# Patient Record
Sex: FEMALE | Race: WHITE | NOT HISPANIC OR LATINO | URBAN - METROPOLITAN AREA
[De-identification: names, ages, dates, MRNs, and addresses within clinical notes are randomized per-mention and may not be internally consistent; named-entity substitution may affect disease eponyms.]

---

## 2024-04-05 ENCOUNTER — APPOINTMENT (OUTPATIENT)
Dept: RADIOLOGY | Facility: MEDICAL CENTER | Age: 29
DRG: 093 | End: 2024-04-05
Attending: EMERGENCY MEDICINE
Payer: COMMERCIAL

## 2024-04-05 ENCOUNTER — HOSPITAL ENCOUNTER (INPATIENT)
Facility: MEDICAL CENTER | Age: 29
LOS: 2 days | DRG: 093 | End: 2024-04-08
Attending: EMERGENCY MEDICINE | Admitting: STUDENT IN AN ORGANIZED HEALTH CARE EDUCATION/TRAINING PROGRAM
Payer: COMMERCIAL

## 2024-04-05 DIAGNOSIS — R51.9 ACUTE NONINTRACTABLE HEADACHE, UNSPECIFIED HEADACHE TYPE: ICD-10-CM

## 2024-04-05 DIAGNOSIS — R11.2 NAUSEA AND VOMITING, UNSPECIFIED VOMITING TYPE: ICD-10-CM

## 2024-04-05 DIAGNOSIS — M54.2 NECK PAIN: ICD-10-CM

## 2024-04-05 LAB
ANION GAP SERPL CALC-SCNC: 13 MMOL/L (ref 7–16)
BUN SERPL-MCNC: 7 MG/DL (ref 8–22)
CALCIUM SERPL-MCNC: 9.4 MG/DL (ref 8.5–10.5)
CHLORIDE SERPL-SCNC: 107 MMOL/L (ref 96–112)
CO2 SERPL-SCNC: 20 MMOL/L (ref 20–33)
CREAT SERPL-MCNC: 0.72 MG/DL (ref 0.5–1.4)
GFR SERPLBLD CREATININE-BSD FMLA CKD-EPI: 116 ML/MIN/1.73 M 2
GLUCOSE SERPL-MCNC: 97 MG/DL (ref 65–99)
HCG SERPL QL: NEGATIVE
POTASSIUM SERPL-SCNC: 4 MMOL/L (ref 3.6–5.5)
SODIUM SERPL-SCNC: 140 MMOL/L (ref 135–145)

## 2024-04-05 PROCEDURE — 96372 THER/PROPH/DIAG INJ SC/IM: CPT

## 2024-04-05 PROCEDURE — A9270 NON-COVERED ITEM OR SERVICE: HCPCS | Performed by: STUDENT IN AN ORGANIZED HEALTH CARE EDUCATION/TRAINING PROGRAM

## 2024-04-05 PROCEDURE — 96375 TX/PRO/DX INJ NEW DRUG ADDON: CPT

## 2024-04-05 PROCEDURE — 99222 1ST HOSP IP/OBS MODERATE 55: CPT | Performed by: STUDENT IN AN ORGANIZED HEALTH CARE EDUCATION/TRAINING PROGRAM

## 2024-04-05 PROCEDURE — 700111 HCHG RX REV CODE 636 W/ 250 OVERRIDE (IP): Mod: JZ | Performed by: EMERGENCY MEDICINE

## 2024-04-05 PROCEDURE — 99285 EMERGENCY DEPT VISIT HI MDM: CPT

## 2024-04-05 PROCEDURE — 84100 ASSAY OF PHOSPHORUS: CPT

## 2024-04-05 PROCEDURE — 70450 CT HEAD/BRAIN W/O DYE: CPT

## 2024-04-05 PROCEDURE — 80048 BASIC METABOLIC PNL TOTAL CA: CPT

## 2024-04-05 PROCEDURE — 700111 HCHG RX REV CODE 636 W/ 250 OVERRIDE (IP): Mod: JZ | Performed by: STUDENT IN AN ORGANIZED HEALTH CARE EDUCATION/TRAINING PROGRAM

## 2024-04-05 PROCEDURE — 83735 ASSAY OF MAGNESIUM: CPT

## 2024-04-05 PROCEDURE — G0378 HOSPITAL OBSERVATION PER HR: HCPCS

## 2024-04-05 PROCEDURE — 36415 COLL VENOUS BLD VENIPUNCTURE: CPT

## 2024-04-05 PROCEDURE — 700105 HCHG RX REV CODE 258: Performed by: EMERGENCY MEDICINE

## 2024-04-05 PROCEDURE — 96374 THER/PROPH/DIAG INJ IV PUSH: CPT

## 2024-04-05 PROCEDURE — 700102 HCHG RX REV CODE 250 W/ 637 OVERRIDE(OP): Performed by: STUDENT IN AN ORGANIZED HEALTH CARE EDUCATION/TRAINING PROGRAM

## 2024-04-05 PROCEDURE — 85025 COMPLETE CBC W/AUTO DIFF WBC: CPT

## 2024-04-05 PROCEDURE — 84703 CHORIONIC GONADOTROPIN ASSAY: CPT

## 2024-04-05 PROCEDURE — 96376 TX/PRO/DX INJ SAME DRUG ADON: CPT

## 2024-04-05 RX ORDER — PROMETHAZINE HYDROCHLORIDE 25 MG/1
12.5-25 SUPPOSITORY RECTAL EVERY 4 HOURS PRN
Status: DISCONTINUED | OUTPATIENT
Start: 2024-04-05 | End: 2024-04-08 | Stop reason: HOSPADM

## 2024-04-05 RX ORDER — DIPHENHYDRAMINE HYDROCHLORIDE 50 MG/ML
25 INJECTION INTRAMUSCULAR; INTRAVENOUS ONCE
Status: COMPLETED | OUTPATIENT
Start: 2024-04-05 | End: 2024-04-05

## 2024-04-05 RX ORDER — ONDANSETRON 2 MG/ML
4 INJECTION INTRAMUSCULAR; INTRAVENOUS EVERY 4 HOURS PRN
Status: DISCONTINUED | OUTPATIENT
Start: 2024-04-05 | End: 2024-04-08 | Stop reason: HOSPADM

## 2024-04-05 RX ORDER — PROMETHAZINE HYDROCHLORIDE 25 MG/1
12.5-25 TABLET ORAL EVERY 4 HOURS PRN
Status: DISCONTINUED | OUTPATIENT
Start: 2024-04-05 | End: 2024-04-08 | Stop reason: HOSPADM

## 2024-04-05 RX ORDER — ONDANSETRON 4 MG/1
4 TABLET, ORALLY DISINTEGRATING ORAL EVERY 4 HOURS PRN
Status: DISCONTINUED | OUTPATIENT
Start: 2024-04-05 | End: 2024-04-08 | Stop reason: HOSPADM

## 2024-04-05 RX ORDER — IBUPROFEN 400 MG/1
600 TABLET ORAL EVERY 6 HOURS PRN
Status: DISCONTINUED | OUTPATIENT
Start: 2024-04-05 | End: 2024-04-08 | Stop reason: HOSPADM

## 2024-04-05 RX ORDER — KETOROLAC TROMETHAMINE 15 MG/ML
15 INJECTION, SOLUTION INTRAMUSCULAR; INTRAVENOUS ONCE
Status: COMPLETED | OUTPATIENT
Start: 2024-04-05 | End: 2024-04-05

## 2024-04-05 RX ORDER — SODIUM CHLORIDE 9 MG/ML
1000 INJECTION, SOLUTION INTRAVENOUS ONCE
Status: COMPLETED | OUTPATIENT
Start: 2024-04-05 | End: 2024-04-05

## 2024-04-05 RX ORDER — METOCLOPRAMIDE HYDROCHLORIDE 5 MG/ML
10 INJECTION INTRAMUSCULAR; INTRAVENOUS ONCE
Status: COMPLETED | OUTPATIENT
Start: 2024-04-05 | End: 2024-04-05

## 2024-04-05 RX ORDER — ENOXAPARIN SODIUM 100 MG/ML
40 INJECTION SUBCUTANEOUS DAILY
Status: DISCONTINUED | OUTPATIENT
Start: 2024-04-05 | End: 2024-04-06

## 2024-04-05 RX ORDER — IBUPROFEN 200 MG
600 TABLET ORAL EVERY 6 HOURS PRN
Status: ON HOLD | COMMUNITY
End: 2024-04-08

## 2024-04-05 RX ORDER — PROCHLORPERAZINE EDISYLATE 5 MG/ML
5-10 INJECTION INTRAMUSCULAR; INTRAVENOUS EVERY 4 HOURS PRN
Status: DISCONTINUED | OUTPATIENT
Start: 2024-04-05 | End: 2024-04-08 | Stop reason: HOSPADM

## 2024-04-05 RX ORDER — ACETAMINOPHEN 325 MG/1
650 TABLET ORAL EVERY 6 HOURS PRN
Status: DISCONTINUED | OUTPATIENT
Start: 2024-04-05 | End: 2024-04-08 | Stop reason: HOSPADM

## 2024-04-05 RX ORDER — DEXAMETHASONE SODIUM PHOSPHATE 4 MG/ML
10 INJECTION, SOLUTION INTRA-ARTICULAR; INTRALESIONAL; INTRAMUSCULAR; INTRAVENOUS; SOFT TISSUE ONCE
Status: COMPLETED | OUTPATIENT
Start: 2024-04-05 | End: 2024-04-05

## 2024-04-05 RX ORDER — CYCLOBENZAPRINE HCL 10 MG
10 TABLET ORAL EVERY 6 HOURS PRN
Qty: 10 TABLET | Refills: 0 | Status: SHIPPED | OUTPATIENT
Start: 2024-04-05

## 2024-04-05 RX ADMIN — DIPHENHYDRAMINE HYDROCHLORIDE 25 MG: 50 INJECTION, SOLUTION INTRAMUSCULAR; INTRAVENOUS at 17:41

## 2024-04-05 RX ADMIN — METOCLOPRAMIDE 10 MG: 5 INJECTION, SOLUTION INTRAMUSCULAR; INTRAVENOUS at 17:42

## 2024-04-05 RX ADMIN — SODIUM CHLORIDE 1000 ML: 9 INJECTION, SOLUTION INTRAVENOUS at 15:23

## 2024-04-05 RX ADMIN — KETOROLAC TROMETHAMINE 15 MG: 15 INJECTION, SOLUTION INTRAMUSCULAR; INTRAVENOUS at 15:30

## 2024-04-05 RX ADMIN — IBUPROFEN 600 MG: 400 TABLET, FILM COATED ORAL at 20:41

## 2024-04-05 RX ADMIN — DIPHENHYDRAMINE HYDROCHLORIDE 25 MG: 50 INJECTION, SOLUTION INTRAMUSCULAR; INTRAVENOUS at 15:25

## 2024-04-05 RX ADMIN — DEXAMETHASONE SODIUM PHOSPHATE 10 MG: 4 INJECTION INTRA-ARTICULAR; INTRALESIONAL; INTRAMUSCULAR; INTRAVENOUS; SOFT TISSUE at 17:43

## 2024-04-05 RX ADMIN — ENOXAPARIN SODIUM 40 MG: 100 INJECTION SUBCUTANEOUS at 20:41

## 2024-04-05 RX ADMIN — METOCLOPRAMIDE 10 MG: 5 INJECTION, SOLUTION INTRAMUSCULAR; INTRAVENOUS at 15:24

## 2024-04-05 ASSESSMENT — PATIENT HEALTH QUESTIONNAIRE - PHQ9
SUM OF ALL RESPONSES TO PHQ9 QUESTIONS 1 AND 2: 0
2. FEELING DOWN, DEPRESSED, IRRITABLE, OR HOPELESS: NOT AT ALL
1. LITTLE INTEREST OR PLEASURE IN DOING THINGS: NOT AT ALL

## 2024-04-05 ASSESSMENT — PAIN DESCRIPTION - PAIN TYPE
TYPE: ACUTE PAIN
TYPE: ACUTE PAIN

## 2024-04-05 ASSESSMENT — ENCOUNTER SYMPTOMS
TINGLING: 0
DOUBLE VISION: 0
HALLUCINATIONS: 0
DIARRHEA: 0
FEVER: 0
INSOMNIA: 0
BACK PAIN: 0
SHORTNESS OF BREATH: 0
SENSORY CHANGE: 0
NERVOUS/ANXIOUS: 0
FOCAL WEAKNESS: 0
ABDOMINAL PAIN: 0
TREMORS: 0
SINUS PAIN: 0
PHOTOPHOBIA: 0
HEADACHES: 1
WEAKNESS: 0
SPUTUM PRODUCTION: 0
PALPITATIONS: 0
CHILLS: 0
DIZZINESS: 0
SPEECH CHANGE: 0
EYE DISCHARGE: 0
ORTHOPNEA: 0
HEMOPTYSIS: 0
VOMITING: 1
NECK PAIN: 0
NAUSEA: 1
EYE PAIN: 0

## 2024-04-05 ASSESSMENT — LIFESTYLE VARIABLES
AVERAGE NUMBER OF DAYS PER WEEK YOU HAVE A DRINK CONTAINING ALCOHOL: 1
EVER HAD A DRINK FIRST THING IN THE MORNING TO STEADY YOUR NERVES TO GET RID OF A HANGOVER: NO
DOES PATIENT WANT TO STOP DRINKING: NO
HOW MANY TIMES IN THE PAST YEAR HAVE YOU HAD 5 OR MORE DRINKS IN A DAY: 5
TOTAL SCORE: 0
ALCOHOL_USE: YES
EVER FELT BAD OR GUILTY ABOUT YOUR DRINKING: NO
ON A TYPICAL DAY WHEN YOU DRINK ALCOHOL HOW MANY DRINKS DO YOU HAVE: 5
HAVE PEOPLE ANNOYED YOU BY CRITICIZING YOUR DRINKING: NO
SUBSTANCE_ABUSE: 0
HAVE YOU EVER FELT YOU SHOULD CUT DOWN ON YOUR DRINKING: NO
CONSUMPTION TOTAL: POSITIVE

## 2024-04-05 NOTE — ED PROVIDER NOTES
ED Provider Note    CHIEF COMPLAINT  Chief Complaint   Patient presents with    Headache     Upper back pain that started on Tuesday when pt woke up. Pt started to radiate up her neck and into her head on Wednesday. Pt rates headache at 9/10 and is worst in the front of her head. +nausea due to pain. Pt states that it hurts to turn her head. Pt has some relief when laying flat.      EXTERNAL RECORDS REVIEWED  No recent or pertinent records.  She was admitted to [facility] for a abdominopelvic abscess.     HPI/ROS  LIMITATION TO HISTORY   Select: : None  OUTSIDE HISTORIAN(S):  None    Rachel Villalobos is a 29 y.o. female who presents to the Emergency Department with headache onset 3 days ago. She describes the pain as a sharp pressure. She reported having some neck pain 4 days ago that worsened throughout the day and ultimately moved up to her neck. She additionally reports one episode of vomiting yesterday. The headache is worsened when she is sitting up and improves when she is laying flat. She states that the neck pain worsens with movement. She notes that she has been unable to get out of bed secondary to the pain. She was seen at urgent care today prior to arrival and was advised to report to the ED. She denies any recent illness, photophobia or phonophobia. She states that she drank 120 ounces of water yesterday and usually stays pretty hydrated. She does not drink coffee. She denies any recent trauma or mechanical injury. She has taken Excedrin and ibuprofen without any improvement of symptoms. She has not taken anything today.     PAST MEDICAL HISTORY  History reviewed. No pertinent past medical history.     SURGICAL HISTORY  History reviewed. No pertinent surgical history.     FAMILY HISTORY  History reviewed. No pertinent family history.    SOCIAL HISTORY   reports that she has never smoked. She has never used smokeless tobacco. She reports current alcohol use. She reports  "that she does not currently use drugs.    CURRENT MEDICATIONS  None noted    ALLERGIES  Patient has no known allergies.    PHYSICAL EXAM  BP (!) 136/90   Pulse (!) 55   Temp 36.2 °C (97.2 °F) (Temporal)   Resp 16   Ht 1.702 m (5' 7\")   Wt 79.8 kg (175 lb 14.8 oz)   SpO2 99%      Constitutional: Nontoxic appearing. Alert in no apparent distress.  HENT: Normocephalic, Atraumatic. Bilateral external ears normal. Nose normal.  Moist mucous membranes.  Oropharynx clear.  Eyes: Pupils are equal and reactive. Conjunctiva normal.   Neck: Supple, full range of motion.Tenderness diffusely along the neck and shoulders, worse along the right paraspinal muscles.   Heart: Regular rate and rhythm.  No murmurs.    Lungs: No respiratory distress, normal work of breathing. Lungs clear to auscultation bilaterally.  Abdomen Soft, no distention.  No tenderness to palpation.  Musculoskeletal: Atraumatic. No obvious deformities noted.  No lower extremity edema.  Skin: Warm, Dry.  No erythema, No rash.   Neurologic: Alert and oriented x3.  Cranial nerves II-XII intact.  Strength 5/5 and sensation intact throughout all 4 extremities.  No pronator drift.  No dysmetria.  Normal speech. Normal gait.  Psychiatric: Affect normal, Mood normal, Appears appropriate and not intoxicated.    DIAGNOSTIC STUDIES / PROCEDURES      LABS  Labs Reviewed   BASIC METABOLIC PANEL - Abnormal; Notable for the following components:       Result Value    Bun 7 (*)     All other components within normal limits   HCG QUAL SERUM   ESTIMATED GFR     RADIOLOGY  I have independently interpreted the diagnostic imaging associated with this visit and am waiting the final reading from the radiologist.   My preliminary interpretation is as follows: no intracranial mass    Radiologist interpretation:  CT-HEAD W/O   Final Result         1. No acute intracranial abnormality. No evidence of acute intracranial hemorrhage or mass lesion.                       COURSE & " MEDICAL DECISION MAKING    2:50 PM - Patient seen and examined at bedside. Discussed plan of care, including ordering labs to further evaluate. Patient agrees to the plan of care. The patient will be resuscitated with 1L NS IV and medicated with Toradol 15 mg/mL injection, Reglan injection 10 mg, and Benadryl injection 25 mg. Ordered for BMP and hCG to evaluate her symptoms.     4:26 PM - Upon reassessment, patient is resting comfortably with stable vital signs. Her pain has improved following treatment. Discussed likelihood that patient's symptoms were due to muscle tightness along her neck causing a tension headache. Discussed having patient ambulate to see if her symptoms worsen before discharging home. She is agreeable to this plan.     5:15 PM - Reevaluated patient at bedside. Her headache is significantly worsened when she sits up. Given these findings, and concern for increased intracranial pressure, CT-head w/o to rule out any emergent processes.     ASSESSMENT, COURSE AND PLAN  Care Narrative: Young relatively healthy patient who presents with gradually worsening positional headache which has been going on for the last 4 days.  She is overall well-appearing with normal vital signs on arrival.  She has no focal neurologic deficits on exam.  History and exam are not concerning for subarachnoid hemorrhage or meningitis.  Initially this was thought to be due to tension headache therefore she was treated symptomatically.  The patient's symptoms are essentially gone when lying down however quickly develop upon sitting up.  Due to persistent positional symptoms, imaging was performed without evidence of intracranial mass or hemorrhage.  The patient continues to be significantly symptomatic therefore I think will need admission for further workup.  I am concerned about a possible spontaneous CSF leak.    7:30PM -I discussed the case with Dr. Brooks, neurologist on-call.  He is recommending MRI of the brain with  and without contrast to assess for secondary signs of low CSF pressure.  If this is abnormal, she may need further imaging of her spine.  Recommends IV hydration and caffeine.  Patient will need general neurology consult in the morning    7:56 PM - Upon reassessment, patient is resting comfortably with normal vital signs.  No new complaints at this time.  Discussed results with patient and/or family as well as plan of care for admission. Patient verbalizes understanding and agreement to this plan of care    DISPOSITION AND DISCUSSIONS  I have discussed management of the patient with the following physicians and LEILANI's:    Dr. Brooks  7:57 PM -I discussed the case with Dr. Sorensen, hospitalist on-call, who accepts admission of the patient.      Barriers to care at this time, including but not limited to: Patient does not have established PCP.     DISPOSITION:  Patient will be hospitalized by Dr. Sorensen in guarded condition.      FINAL DIAGNOSIS  1. Acute nonintractable headache, unspecified headache type    2. Neck pain        The note accurately reflects work and decisions made by me.  Elaine Mosqueda M.D.  4/5/2024  7:59 PM     Katie ACEVEDO (Yasir), am scribing for, and in the presence of, Elaine Mosqueda M.D..    Electronically signed by: Katie Mendoza), 4/5/2024    Elaine ACEVEDO M.D. personally performed the services described in this documentation, as scribed by Katie Broussard in my presence, and it is both accurate and complete.

## 2024-04-05 NOTE — ED TRIAGE NOTES
"Chief Complaint   Patient presents with    Back Pain     Upper back pain that started on Tuesday when pt woke up. Pt started to radiate up her neck and into her head on Wednesday. Pt rates headache at 9/10 and is worst in the front of her head. +nausea due to pain. Pt states that it hurts to turn her head.          Pt ambulated to triage for above complaint.  Pt is AO x 4, follows commands, and responds appropriately to questions. Patient's breathing is unlabored and pain is currently 9/10 on the 0-10 pain scale.  Pt placed in lobby. Patient educated on triage process and encouraged to alert staff for any changes.      BP (!) 136/90   Pulse (!) 55   Temp 36.2 °C (97.2 °F) (Temporal)   Resp 16   Ht 1.702 m (5' 7\")   Wt 79.8 kg (175 lb 14.8 oz)   SpO2 99%     "

## 2024-04-05 NOTE — DISCHARGE INSTRUCTIONS
You were seen in the Emergency Department for headache.     Labs were completed without significant acute abnormalities.    Please use 1,000mg of tylenol or 600mg of ibuprofen every 6 hours as needed for pain.  You may attempt caffeine as well which can alleviate headaches.  Use muscle relaxer as needed for severe pain.  Drink plenty of fluids.    Please follow up with your primary care physician.    Return to the Emergency Department with uncontrolled pain, new neurologic symptoms such as blurred vision, slurred speech, numbness or weakness in extremities, or other concerns.

## 2024-04-06 ENCOUNTER — APPOINTMENT (OUTPATIENT)
Dept: RADIOLOGY | Facility: MEDICAL CENTER | Age: 29
DRG: 093 | End: 2024-04-06
Payer: COMMERCIAL

## 2024-04-06 PROBLEM — D18.1 HYGROMA: Status: ACTIVE | Noted: 2024-04-06

## 2024-04-06 LAB
ALBUMIN SERPL BCP-MCNC: 4.2 G/DL (ref 3.2–4.9)
ALBUMIN/GLOB SERPL: 1.3 G/DL
ALP SERPL-CCNC: 79 U/L (ref 30–99)
ALT SERPL-CCNC: 20 U/L (ref 2–50)
ANION GAP SERPL CALC-SCNC: 16 MMOL/L (ref 7–16)
AST SERPL-CCNC: 11 U/L (ref 12–45)
BASOPHILS # BLD AUTO: 0.4 % (ref 0–1.8)
BASOPHILS # BLD: 0.04 K/UL (ref 0–0.12)
BILIRUB SERPL-MCNC: 0.3 MG/DL (ref 0.1–1.5)
BUN SERPL-MCNC: 8 MG/DL (ref 8–22)
CALCIUM ALBUM COR SERPL-MCNC: 9.2 MG/DL (ref 8.5–10.5)
CALCIUM SERPL-MCNC: 9.4 MG/DL (ref 8.5–10.5)
CHLORIDE SERPL-SCNC: 108 MMOL/L (ref 96–112)
CO2 SERPL-SCNC: 15 MMOL/L (ref 20–33)
CREAT SERPL-MCNC: 0.6 MG/DL (ref 0.5–1.4)
EOSINOPHIL # BLD AUTO: 0.03 K/UL (ref 0–0.51)
EOSINOPHIL NFR BLD: 0.3 % (ref 0–6.9)
ERYTHROCYTE [DISTWIDTH] IN BLOOD BY AUTOMATED COUNT: 43.3 FL (ref 35.9–50)
ERYTHROCYTE [DISTWIDTH] IN BLOOD BY AUTOMATED COUNT: 44.4 FL (ref 35.9–50)
GFR SERPLBLD CREATININE-BSD FMLA CKD-EPI: 124 ML/MIN/1.73 M 2
GLOBULIN SER CALC-MCNC: 3.2 G/DL (ref 1.9–3.5)
GLUCOSE SERPL-MCNC: 149 MG/DL (ref 65–99)
HCT VFR BLD AUTO: 44.1 % (ref 37–47)
HCT VFR BLD AUTO: 46.9 % (ref 37–47)
HGB BLD-MCNC: 15.1 G/DL (ref 12–16)
HGB BLD-MCNC: 15.5 G/DL (ref 12–16)
IMM GRANULOCYTES # BLD AUTO: 0.03 K/UL (ref 0–0.11)
IMM GRANULOCYTES NFR BLD AUTO: 0.3 % (ref 0–0.9)
LYMPHOCYTES # BLD AUTO: 2.21 K/UL (ref 1–4.8)
LYMPHOCYTES NFR BLD: 23.2 % (ref 22–41)
MAGNESIUM SERPL-MCNC: 1.9 MG/DL (ref 1.5–2.5)
MCH RBC QN AUTO: 31.6 PG (ref 27–33)
MCH RBC QN AUTO: 31.9 PG (ref 27–33)
MCHC RBC AUTO-ENTMCNC: 33 G/DL (ref 32.2–35.5)
MCHC RBC AUTO-ENTMCNC: 34.2 G/DL (ref 32.2–35.5)
MCV RBC AUTO: 93.2 FL (ref 81.4–97.8)
MCV RBC AUTO: 95.7 FL (ref 81.4–97.8)
MONOCYTES # BLD AUTO: 0.62 K/UL (ref 0–0.85)
MONOCYTES NFR BLD AUTO: 6.5 % (ref 0–13.4)
NEUTROPHILS # BLD AUTO: 6.59 K/UL (ref 1.82–7.42)
NEUTROPHILS NFR BLD: 69.3 % (ref 44–72)
NRBC # BLD AUTO: 0 K/UL
NRBC BLD-RTO: 0 /100 WBC (ref 0–0.2)
PHOSPHATE SERPL-MCNC: 2.6 MG/DL (ref 2.5–4.5)
PLATELET # BLD AUTO: 331 K/UL (ref 164–446)
PLATELET # BLD AUTO: 352 K/UL (ref 164–446)
PMV BLD AUTO: 10.9 FL (ref 9–12.9)
PMV BLD AUTO: 9.7 FL (ref 9–12.9)
POTASSIUM SERPL-SCNC: 4.3 MMOL/L (ref 3.6–5.5)
PROT SERPL-MCNC: 7.4 G/DL (ref 6–8.2)
RBC # BLD AUTO: 4.73 M/UL (ref 4.2–5.4)
RBC # BLD AUTO: 4.9 M/UL (ref 4.2–5.4)
SODIUM SERPL-SCNC: 139 MMOL/L (ref 135–145)
WBC # BLD AUTO: 11.1 K/UL (ref 4.8–10.8)
WBC # BLD AUTO: 9.5 K/UL (ref 4.8–10.8)

## 2024-04-06 PROCEDURE — 80053 COMPREHEN METABOLIC PANEL: CPT

## 2024-04-06 PROCEDURE — A9270 NON-COVERED ITEM OR SERVICE: HCPCS

## 2024-04-06 PROCEDURE — 85027 COMPLETE CBC AUTOMATED: CPT

## 2024-04-06 PROCEDURE — 700111 HCHG RX REV CODE 636 W/ 250 OVERRIDE (IP): Mod: JZ

## 2024-04-06 PROCEDURE — A9579 GAD-BASE MR CONTRAST NOS,1ML: HCPCS

## 2024-04-06 PROCEDURE — 70553 MRI BRAIN STEM W/O & W/DYE: CPT

## 2024-04-06 PROCEDURE — 770001 HCHG ROOM/CARE - MED/SURG/GYN PRIV*

## 2024-04-06 PROCEDURE — 700117 HCHG RX CONTRAST REV CODE 255

## 2024-04-06 PROCEDURE — 700102 HCHG RX REV CODE 250 W/ 637 OVERRIDE(OP)

## 2024-04-06 PROCEDURE — 700101 HCHG RX REV CODE 250

## 2024-04-06 PROCEDURE — 96376 TX/PRO/DX INJ SAME DRUG ADON: CPT

## 2024-04-06 PROCEDURE — 72156 MRI NECK SPINE W/O & W/DYE: CPT

## 2024-04-06 PROCEDURE — 99233 SBSQ HOSP IP/OBS HIGH 50: CPT | Mod: FS

## 2024-04-06 PROCEDURE — 700105 HCHG RX REV CODE 258: Performed by: STUDENT IN AN ORGANIZED HEALTH CARE EDUCATION/TRAINING PROGRAM

## 2024-04-06 PROCEDURE — 96375 TX/PRO/DX INJ NEW DRUG ADDON: CPT

## 2024-04-06 PROCEDURE — 99222 1ST HOSP IP/OBS MODERATE 55: CPT | Performed by: PSYCHIATRY & NEUROLOGY

## 2024-04-06 RX ORDER — DIAZEPAM 5 MG/ML
2.5 INJECTION, SOLUTION INTRAMUSCULAR; INTRAVENOUS ONCE
Status: COMPLETED | OUTPATIENT
Start: 2024-04-06 | End: 2024-04-06

## 2024-04-06 RX ORDER — CYCLOBENZAPRINE HCL 5 MG
10 TABLET ORAL 3 TIMES DAILY PRN
Status: DISCONTINUED | OUTPATIENT
Start: 2024-04-06 | End: 2024-04-06

## 2024-04-06 RX ORDER — LIDOCAINE 4 G/G
1 PATCH TOPICAL EVERY 24 HOURS
Status: DISCONTINUED | OUTPATIENT
Start: 2024-04-06 | End: 2024-04-06

## 2024-04-06 RX ORDER — HYDROXYZINE HYDROCHLORIDE 25 MG/1
25 TABLET, FILM COATED ORAL
Status: COMPLETED | OUTPATIENT
Start: 2024-04-06 | End: 2024-04-06

## 2024-04-06 RX ORDER — SUMATRIPTAN 25 MG/1
25 TABLET, FILM COATED ORAL
Status: DISCONTINUED | OUTPATIENT
Start: 2024-04-06 | End: 2024-04-08 | Stop reason: HOSPADM

## 2024-04-06 RX ORDER — DIAZEPAM 5 MG/ML
2.5 INJECTION, SOLUTION INTRAMUSCULAR; INTRAVENOUS EVERY 6 HOURS PRN
Status: DISCONTINUED | OUTPATIENT
Start: 2024-04-06 | End: 2024-04-06

## 2024-04-06 RX ORDER — SODIUM CHLORIDE 9 MG/ML
INJECTION, SOLUTION INTRAVENOUS CONTINUOUS
Status: DISCONTINUED | OUTPATIENT
Start: 2024-04-06 | End: 2024-04-07

## 2024-04-06 RX ORDER — BUTALBITAL, ACETAMINOPHEN AND CAFFEINE 50; 325; 40 MG/1; MG/1; MG/1
1 TABLET ORAL EVERY 6 HOURS PRN
Status: DISCONTINUED | OUTPATIENT
Start: 2024-04-06 | End: 2024-04-08 | Stop reason: HOSPADM

## 2024-04-06 RX ADMIN — HYDROXYZINE HYDROCHLORIDE 25 MG: 25 TABLET, FILM COATED ORAL at 20:30

## 2024-04-06 RX ADMIN — CYCLOBENZAPRINE HYDROCHLORIDE 10 MG: 5 TABLET, FILM COATED ORAL at 13:35

## 2024-04-06 RX ADMIN — GADOTERIDOL 15 ML: 279.3 INJECTION, SOLUTION INTRAVENOUS at 12:57

## 2024-04-06 RX ADMIN — SODIUM CHLORIDE: 9 INJECTION, SOLUTION INTRAVENOUS at 18:06

## 2024-04-06 RX ADMIN — LIDOCAINE 1 PATCH: 4 PATCH TOPICAL at 13:35

## 2024-04-06 RX ADMIN — BUTALBITAL, ACETAMINOPHEN AND CAFFEINE 1 TABLET: 325; 50; 40 TABLET ORAL at 05:11

## 2024-04-06 RX ADMIN — DIAZEPAM 2.5 MG: 5 INJECTION, SOLUTION INTRAMUSCULAR; INTRAVENOUS at 11:59

## 2024-04-06 ASSESSMENT — ENCOUNTER SYMPTOMS
SENSORY CHANGE: 0
TINGLING: 0
CHILLS: 0
SORE THROAT: 0
COUGH: 0
HEADACHES: 1
DOUBLE VISION: 0
VOMITING: 0
SHORTNESS OF BREATH: 0
BLURRED VISION: 0
WEAKNESS: 0
FALLS: 0
NECK PAIN: 1
PALPITATIONS: 0
ABDOMINAL PAIN: 0
NERVOUS/ANXIOUS: 0
PHOTOPHOBIA: 0
FEVER: 0
NAUSEA: 1
DIZZINESS: 0

## 2024-04-06 ASSESSMENT — PAIN DESCRIPTION - PAIN TYPE
TYPE: ACUTE PAIN
TYPE: ACUTE PAIN

## 2024-04-06 NOTE — ASSESSMENT & PLAN NOTE
Neuro IR on the case  MRI brain, cervical, thoracic, lumbar spine with and without completed showing hygroma from C3-4 to L3-4 and upper thoracic levels showing mild central stenosis with effacement of subarachnoid space in the thecal sac

## 2024-04-06 NOTE — PROGRESS NOTES
Patient returned to unit from MRI. Patient in bed and medications given per MAR. Bed in lowest position and locked with call light within reach.

## 2024-04-06 NOTE — HOSPITAL COURSE
Rachel Villalobos is a 29 y.o. female with no past medical history who presented 4/5/2024 with headaches.     Patient denies any history of headaches in the past or history of migraines.  States that over the last 4 days she is experiencing positional headaches. She states that when she lays down flat, she feels fine. However when she sits up, after 30 seconds she begins to experience headaches located in the frontal and occipital regions.  It is associated with some nausea and vomiting as well.      In the emergency department, vital signs stable.  CT head was unremarkable.  Case was briefly discussed between the ER physician and neurology on-call.  Due to concern for CSF leak, recommending MRI brain with and without contrast at this time.     MRI brain and cervical spine completed which is showing findings consistent with subdural or epidural hygroma.  This may be related to spontaneous or posttraumatic CSF leak.  I have discussed the case with neuro IR and neurosurgery.  At this time neuro IR is recommending MRI of the thoracic and lumbar spine. Potential intervention by neuro IR on Monday.    Patient seen and examined this a.m.  She states that there is not much change from yesterday where getting up still gives her headache but today it is taking longer for the headache onset.  She is denying any numbness, tingling, dizziness, lightheadedness, or any other sensory deficits.  The only positive review of system is headache.

## 2024-04-06 NOTE — ASSESSMENT & PLAN NOTE
CT head unremarkable  MRI brain, cervical, thoracic, lumbar spine with and without completed -no finding that required surgical intervention    Patch ordered  Tylenol and ibuprofen ordered as needed for headaches

## 2024-04-06 NOTE — ED NOTES
Bedside report received from off going RN Robin, assumed care of patient.  POC discussed with patient. Call light within reach, all needs addressed at this time.       Fall risk interventions in place: Move the patient closer to the nurse's station, Patient's personal possessions are with in their safe reach, Place socks on patient, Place fall risk sign on patient's door, Give patient urinal if applicable, Keep floor surfaces clean and dry, and Accompanied to restroom (all applicable per [facility] Fall risk assessment)   Continuous monitoring: Cardiac Leads, Pulse Ox, or Blood Pressure  IVF/IV medications: Not Applicable   Oxygen: Room Air  Bedside sitter: Not Applicable   Isolation: Not Applicable

## 2024-04-06 NOTE — CARE PLAN
The patient is Stable - Low risk of patient condition declining or worsening    Shift Goals  Clinical Goals: MRI  Patient Goals: relieve headache    Progress made toward(s) clinical / shift goals:    Problem: Pain - Standard  Goal: Alleviation of pain or a reduction in pain to the patient’s comfort goal  Description: Target End Date:  Prior to discharge or change in level of care    Document on Vitals flowsheet    1.  Document pain using the appropriate pain scale per order or unit policy  2.  Educate and implement non-pharmacologic comfort measures (i.e. relaxation, distraction, massage, cold/heat therapy, etc.)  3.  Pain management medications as ordered  4.  Reassess pain after pain med administration per policy  5.  If opiods administered assess patient's response to pain medication is appropriate per POSS sedation scale  6.  Follow pain management plan developed in collaboration with patient and interdisciplinary team (including palliative care or pain specialists if applicable)  Outcome: Progressing     Problem: Knowledge Deficit - Standard  Goal: Patient and family/care givers will demonstrate understanding of plan of care, disease process/condition, diagnostic tests and medications  Description: Target End Date:  1-3 days or as soon as patient condition allows    Document in Patient Education    1.  Patient and family/caregiver oriented to unit, equipment, visitation policy and means for communicating concern  2.  Complete/review Learning Assessment  3.  Assess knowledge level of disease process/condition, treatment plan, diagnostic tests and medications  4.  Explain disease process/condition, treatment plan, diagnostic tests and medications  Outcome: Progressing

## 2024-04-06 NOTE — ED NOTES
BS report from Salvatore RN  Pt resting in NAD, VSS  Call light in reach, awaiting results at this time  Pt is A&O x4, and ambulates- w/ standby as pt has increased pain w/ getting up  No oxygen and no isolation

## 2024-04-06 NOTE — ED NOTES
Pt states that when she sat up her headache came back to 10/10 within 30 seconds, pt medicated with ordered meds, will continue to monitor

## 2024-04-06 NOTE — PROGRESS NOTES
[facility] Medicine Daily Progress Note    Date of Service  4/6/2024    Chief Complaint  Rachel Villalobos is a 29 y.o. female admitted 4/5/2024 with headaches.    Hospital Course  Rachel Villalobos is a 29 y.o. female with no past medical history who presented 4/5/2024 with headaches.     Patient denies any history of headaches in the past or history of migraines.  States that over the last 4 days she is experiencing positional headaches. She states that when she lays down flat, she feels fine. However when she sits up, after 30 seconds she begins to experience headaches located in the frontal and occipital regions.  It is associated with some nausea and vomiting as well.      In the emergency department, vital signs stable.  CT head was unremarkable.  Case was briefly discussed between the ER physician and neurology on-call.  Due to concern for CSF leak, recommending MRI brain with and without contrast at this time.     MRI brain and cervical spine completed which is showing findings consistent with subdural or epidural hygroma.  This may be related to spontaneous or posttraumatic CSF leak.  I have discussed the case with neuro IR and neurosurgery.  At this time neuro IR is recommending MRI of the thoracic and lumbar spine. Potential intervention by neuro IR on Monday.    Patient seen and examined she is stating that as long as she is laying down her headache is approximately 3 out of 10 for pain.  She states it only gets worse when she is sitting up or getting up to use the restroom.  She is denying any numbness or tingling, weakness, or sensory deficits.  I discussed the recommendations that were given to us by neuro IR.  She is aware that more imaging is going to be completed this weekend.  All questions asked answered at this time.      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  Since she has received contrast today MRI with appeal to be completed for 24 hours.  Continue medication for symptom management.  -Disposition: Patient is being transferred inpatient for further imaging and intervention.    I have discussed this patient's plan of care and discharge plan at IDT rounds today with Case Management, Nursing, Nursing leadership, and other members of the IDT team.    Consultants/Specialty  neurosurgery (Chart Review)  Neuro IR    Code Status  Full Code    Disposition  The patient is not medically cleared for discharge to home or a post-acute facility.  Anticipate discharge to: home with close outpatient follow-up    I have placed the appropriate orders for post-discharge needs.    Review of Systems  Review of Systems   Constitutional:  Negative for chills, fever and malaise/fatigue.   HENT:  Negative for congestion and sore throat.    Eyes:  Negative for blurred vision, double vision and photophobia.   Respiratory:  Negative for cough and shortness of breath.    Cardiovascular:  Negative for chest pain, palpitations and leg swelling.   Gastrointestinal:  Positive for nausea. Negative for abdominal pain and vomiting.   Genitourinary:  Negative for dysuria and frequency.   Musculoskeletal:  Positive for neck pain. Negative for falls.   Skin:  Negative for itching and rash.   Neurological:  Positive for headaches. Negative for dizziness, tingling, sensory change and weakness.   Psychiatric/Behavioral:  The patient is not nervous/anxious.         Physical Exam  Temp:  [36.2 °C (97.2 °F)-36.8 °C (98.3 °F)] 36.8 °C (98.3 °F)  Pulse:  [57-71] 68  Resp:  [14-16] 16  BP: (116-138)/(71-85) 119/75  SpO2:  [90 %-97 %] 96 %    Physical Exam  Constitutional:       Appearance: Normal appearance.   HENT:      Head: Normocephalic and atraumatic.      Nose: Nose normal.      Mouth/Throat:      Mouth: Mucous membranes are moist.   Eyes:      Pupils:  Pupils are equal, round, and reactive to light.   Cardiovascular:      Rate and Rhythm: Normal rate and regular rhythm.      Pulses: Normal pulses.      Heart sounds: Normal heart sounds.   Pulmonary:      Effort: Pulmonary effort is normal.      Breath sounds: Normal breath sounds.   Abdominal:      General: Bowel sounds are normal.      Palpations: Abdomen is soft.   Musculoskeletal:         General: Normal range of motion.      Cervical back: Normal range of motion.   Skin:     General: Skin is warm and dry.      Capillary Refill: Capillary refill takes less than 2 seconds.   Neurological:      General: No focal deficit present.      Mental Status: She is alert. Mental status is at baseline.      Sensory: No sensory deficit.      Motor: No weakness.      Coordination: Coordination normal.   Psychiatric:         Mood and Affect: Mood normal.         Behavior: Behavior normal.         Fluids    Intake/Output Summary (Last 24 hours) at 4/6/2024 1549  Last data filed at 4/6/2024 1332  Gross per 24 hour   Intake 240 ml   Output --   Net 240 ml       Laboratory  Recent Labs     04/05/24  1528 04/06/24  0510   WBC 9.5 11.1*   RBC 4.90 4.73   HEMOGLOBIN 15.5 15.1   HEMATOCRIT 46.9 44.1   MCV 95.7 93.2   MCH 31.6 31.9   MCHC 33.0 34.2   RDW 44.4 43.3   PLATELETCT 352 331   MPV 10.9 9.7     Recent Labs     04/05/24  1528 04/06/24  0510   SODIUM 140 139   POTASSIUM 4.0 4.3   CHLORIDE 107 108   CO2 20 15*   GLUCOSE 97 149*   BUN 7* 8   CREATININE 0.72 0.60   CALCIUM 9.4 9.4                   Imaging  MR-CERVICAL SPINE-WITH & W/O   Final Result      1.  Rim of extradural CSF signal fluid surrounding the thecal sac extending from about C3-C4 down to the T2 level at the bottom of the field-of-view. Thin rim of corresponding primarily dorsal dural enhancement. Findings are most consistent with subdural    or epidural hygroma. As clinically suspected, this may be related to a spontaneous or posttraumatic CSF leak (assuming the  "patient has not had a recent lumbar puncture).   2.  No appreciable arachnoid cyst or perineural cyst at the cervical levels as a potential source for CSF leakage.   3.  No significant degenerative changes.   4.  No myelopathic cord signal.      MR-BRAIN-WITH & W/O   Final Result      1.  MRI OF THE BRAIN WITHOUT AND WITH CONTRAST WITHIN NORMAL LIMITS.   2.  REGARDING THE CLINICAL CONCERN FOR \"CSF LEAK\", NO EXTRA-AXIAL FLUID COLLECTIONS, BRAIN SAGGING, OR OTHER STIGMATA OF INTRACRANIAL HYPOTENSION.      CT-HEAD W/O   Final Result         1. No acute intracranial abnormality. No evidence of acute intracranial hemorrhage or mass lesion.                     MR-LUMBAR SPINE-WITH & W/O    (Results Pending)   MR-THORACIC SPINE-WITH & W/O    (Results Pending)        Assessment/Plan  * Nonintractable headache, unspecified chronicity pattern, unspecified headache type- (present on admission)  Assessment & Plan  Patient is a 4-day history of positional headaches.  Worse when sitting up  No improvement after migraine cocktail given in the emergency department  CT head unremarkable  MRI brain negative  MRI cervical spine showing findings consistent with subdural or epidural hygroma  Neuro IR consulted  MRI thoracic and lumbar ordered-cannot be completed until 24 hrs pass since previous MRIs  Tylenol and ibuprofen ordered as needed for headaches    Hygroma- (present on admission)  Assessment & Plan  Neuro IR consulted  MRI thoracic and lumbar with and without ordered per recommendations    Nausea & vomiting  Assessment & Plan  Positional in nature.  Continue with IV and p.o. antiemetics       VTE prophylaxis:    enoxaparin ppx    I have performed a physical exam and reviewed and updated ROS and Plan today (4/6/2024). In review of yesterday's note (4/5/2024), there are no changes except as documented above.      ISummer A.P.R.N. performed a substantiated portion of the service face-to-face with same patient on the " same date of service INDEPENDENTLY FROM THE MD ON ASSESSMENT, EXAMINATION, AND DISCUSSION IN PLAN OF CARE FOR 23 MINUTES.  I was personally involved in reviewing and conducting the medical decision making, including the information as described below:

## 2024-04-06 NOTE — PROGRESS NOTES
4 Eyes Skin Assessment Completed by LIANNE Spencer and LIANNE Snow.    Head WDL  Ears WDL  Nose WDL  Mouth WDL  Neck WDL  Breast/Chest WDL  Shoulder Blades WDL  Spine WDL  (R) Arm/Elbow/Hand WDL  (L) Arm/Elbow/Hand WDL  Abdomen WDL  Groin WDL  Scrotum/Coccyx/Buttocks WDL  (R) Leg WDL  (L) Leg WDL  (R) Heel/Foot/Toe WDL  (L) Heel/Foot/Toe WDL          Devices In Places Blood Pressure Cuff and Pulse Ox      Interventions In Place Pillows    Possible Skin Injury No    Pictures Uploaded Into Epic No, needs to be completed  Wound Consult Placed N/A  RN Wound Prevention Protocol Ordered No

## 2024-04-06 NOTE — ED NOTES
Med rec completed per patient at bedside.    Allergies reviewed with patient. NKDA.    Patient denies using any prescription medications at this time.    Outpatient antibiotics within the last 30 days: none.    ANTICOAGULATION: none.    Patient's preferred pharmacy: [facility] at [address].

## 2024-04-06 NOTE — PROGRESS NOTES
Bedside handoff report received from LIANNE Spencer. Patient in bed resting with no needs at this time. Bed in lowest position and locked with the call light within reach.

## 2024-04-06 NOTE — H&P
Hospital Medicine History & Physical Note    Date of Service  4/5/2024    Primary Care Physician  Pcp Pt States None      Code Status  Full Code    Chief Complaint  Chief Complaint   Patient presents with    Headache     Upper back pain that started on Tuesday when pt woke up. Pt started to radiate up her neck and into her head on Wednesday. Pt rates headache at 9/10 and is worst in the front of her head. +nausea due to pain. Pt states that it hurts to turn her head. Pt has some relief when laying flat.        History of Presenting Illness  Rachel Villalobos is a 29 y.o. female who presented 4/5/2024 with headaches. Patient denies any history of headaches in the past or history of migraines.  States that over the last 4 days she is not experiencing positional headaches.  She states that when she lays down flat, she feels fine.  However when she sits up, after 30 seconds she begins to experience headaches located in the frontal and occipital regions.  It is associated with some nausea and vomiting as well.  In the emergency department, vital signs stable.  CT head was unremarkable.  Case was briefly discussed between the ER physician and neurology on-call.  Due to concern for CSF leak, recommending MRI brain with and without contrast at this time.    I discussed the plan of care with patient.    Review of Systems  Review of Systems   Constitutional:  Negative for chills and fever.   HENT:  Negative for congestion, ear discharge, ear pain, nosebleeds and sinus pain.    Eyes:  Negative for double vision, photophobia, pain and discharge.   Respiratory:  Negative for hemoptysis, sputum production and shortness of breath.    Cardiovascular:  Negative for chest pain, palpitations and orthopnea.   Gastrointestinal:  Positive for nausea and vomiting. Negative for abdominal pain and diarrhea.   Genitourinary:  Negative for frequency, hematuria and urgency.   Musculoskeletal:  Negative for back pain and neck pain.    Neurological:  Positive for headaches. Negative for dizziness, tingling, tremors, sensory change, speech change, focal weakness and weakness.   Psychiatric/Behavioral:  Negative for hallucinations, substance abuse and suicidal ideas. The patient is not nervous/anxious and does not have insomnia.        Past Medical History   has no past medical history on file.    Surgical History   has no past surgical history on file.     Family History  family history is not on file.   Family history reviewed with patient. There is no family history that is pertinent to the chief complaint.     Social History   reports that she has never smoked. She has never used smokeless tobacco. She reports current alcohol use. She reports that she does not currently use drugs.    Allergies  No Known Allergies    Medications  Prior to Admission Medications   Prescriptions Last Dose Informant Patient Reported? Taking?   asa/apap/caffeine (EXCEDRIN) 250-250-65 MG Tab 4/5/2024 at 0900 Patient Yes Yes   Sig: Take 2 Tablets by mouth every 6 hours as needed for Headache.   ibuprofen (MOTRIN) 200 MG Tab 4/4/2024 at 2000 Patient Yes Yes   Sig: Take 600 mg by mouth every 6 hours as needed for Mild Pain or Moderate Pain. 3 tablets = 600 mg.      Facility-Administered Medications: None       Physical Exam  Temp:  [36.2 °C (97.2 °F)-36.4 °C (97.5 °F)] 36.2 °C (97.2 °F)  Pulse:  [55-71] 71  Resp:  [14-16] 14  BP: (110-153)/(62-90) 136/81  SpO2:  [92 %-99 %] 92 %  Blood Pressure: 136/81   Temperature: 36.2 °C (97.2 °F)   Pulse: 71   Respiration: 14   Pulse Oximetry: 92 %       Physical Exam  Constitutional:       General: She is not in acute distress.     Appearance: Normal appearance. She is normal weight. She is not ill-appearing, toxic-appearing or diaphoretic.   HENT:      Head: Normocephalic and atraumatic.      Nose: Nose normal.      Mouth/Throat:      Mouth: Mucous membranes are moist.   Eyes:      Extraocular Movements: Extraocular movements  "intact.      Pupils: Pupils are equal, round, and reactive to light.   Cardiovascular:      Rate and Rhythm: Normal rate and regular rhythm.      Pulses: Normal pulses.      Heart sounds: Normal heart sounds. No murmur heard.     No friction rub. No gallop.   Pulmonary:      Effort: Pulmonary effort is normal. No respiratory distress.      Breath sounds: No stridor. No wheezing, rhonchi or rales.   Chest:      Chest wall: No tenderness.   Abdominal:      General: Abdomen is flat. There is no distension.      Palpations: Abdomen is soft. There is no mass.      Tenderness: There is no abdominal tenderness. There is no guarding or rebound.      Hernia: No hernia is present.   Musculoskeletal:         General: No swelling, tenderness, deformity or signs of injury.      Right lower leg: No edema.      Left lower leg: No edema.      Comments:      Skin:     General: Skin is warm and dry.      Capillary Refill: Capillary refill takes less than 2 seconds.      Coloration: Skin is not jaundiced or pale.      Findings: No bruising, erythema, lesion or rash.   Neurological:      General: No focal deficit present.      Mental Status: She is alert and oriented to person, place, and time. Mental status is at baseline.      Cranial Nerves: No cranial nerve deficit.      Sensory: No sensory deficit.      Motor: No weakness.      Coordination: Coordination normal.   Psychiatric:         Mood and Affect: Mood normal.         Behavior: Behavior normal.         Laboratory:      Recent Labs     04/05/24  1528   SODIUM 140   POTASSIUM 4.0   CHLORIDE 107   CO2 20   GLUCOSE 97   BUN 7*   CREATININE 0.72   CALCIUM 9.4     Recent Labs     04/05/24  1528   GLUCOSE 97         No results for input(s): \"NTPROBNP\" in the last 72 hours.      No results for input(s): \"TROPONINT\" in the last 72 hours.    Imaging:  CT-HEAD W/O   Final Result         1. No acute intracranial abnormality. No evidence of acute intracranial hemorrhage or mass lesion.    "                  MR-BRAIN-WITH & W/O    (Results Pending)       X-Ray:  I have personally reviewed the images and compared with prior images.  EKG:  I have personally reviewed the images and compared with prior images.    Assessment/Plan:  Justification for Admission Status  I anticipate this patient is appropriate for observation status at this time because headaches    Patient will need a Med/Surg bed on MEDICAL service .  The need is secondary to heacaches.    * Nonintractable headache, unspecified chronicity pattern, unspecified headache type- (present on admission)  Assessment & Plan  Patient is a 4-day history of positional headaches.  Worse when sitting up  No improvement after migraine cocktail given in the emergency department  CT head unremarkable  Case was briefly discussed between ER physician and neurosurgery.  Recommending MRI brain with and without to rule out CSF leak.  If MRI brain is normal, no need to get MRI of her spine.  However if it is abnormal, patient should get a scan of her spine as well  Recommended team do a formal consult with neurology service  Follow-up MRI brain  Tylenol and ibuprofen ordered as needed for headaches    Nausea & vomiting  Assessment & Plan  Positional in nature.  Continue with IV and p.o. antiemetics        VTE prophylaxis: enoxaparin ppx

## 2024-04-06 NOTE — ED NOTES
Report to Chas ABDALLA on CDU  Transport at   Pt transport at   Pt transported upstairs in stable condition w/ chart and all belongings A&O x4  MRI screening form completed

## 2024-04-07 ENCOUNTER — APPOINTMENT (OUTPATIENT)
Dept: RADIOLOGY | Facility: MEDICAL CENTER | Age: 29
DRG: 093 | End: 2024-04-07
Payer: COMMERCIAL

## 2024-04-07 LAB
ANION GAP SERPL CALC-SCNC: 11 MMOL/L (ref 7–16)
BASOPHILS # BLD AUTO: 0.4 % (ref 0–1.8)
BASOPHILS # BLD: 0.03 K/UL (ref 0–0.12)
BUN SERPL-MCNC: 10 MG/DL (ref 8–22)
CALCIUM SERPL-MCNC: 8.7 MG/DL (ref 8.5–10.5)
CHLORIDE SERPL-SCNC: 109 MMOL/L (ref 96–112)
CO2 SERPL-SCNC: 21 MMOL/L (ref 20–33)
CREAT SERPL-MCNC: 0.76 MG/DL (ref 0.5–1.4)
EOSINOPHIL # BLD AUTO: 0.04 K/UL (ref 0–0.51)
EOSINOPHIL NFR BLD: 0.5 % (ref 0–6.9)
ERYTHROCYTE [DISTWIDTH] IN BLOOD BY AUTOMATED COUNT: 45.2 FL (ref 35.9–50)
GFR SERPLBLD CREATININE-BSD FMLA CKD-EPI: 109 ML/MIN/1.73 M 2
GLUCOSE SERPL-MCNC: 129 MG/DL (ref 65–99)
HCT VFR BLD AUTO: 41.1 % (ref 37–47)
HGB BLD-MCNC: 13.7 G/DL (ref 12–16)
IMM GRANULOCYTES # BLD AUTO: 0.01 K/UL (ref 0–0.11)
IMM GRANULOCYTES NFR BLD AUTO: 0.1 % (ref 0–0.9)
LYMPHOCYTES # BLD AUTO: 3.56 K/UL (ref 1–4.8)
LYMPHOCYTES NFR BLD: 44.1 % (ref 22–41)
MCH RBC QN AUTO: 31.9 PG (ref 27–33)
MCHC RBC AUTO-ENTMCNC: 33.3 G/DL (ref 32.2–35.5)
MCV RBC AUTO: 95.8 FL (ref 81.4–97.8)
MONOCYTES # BLD AUTO: 0.6 K/UL (ref 0–0.85)
MONOCYTES NFR BLD AUTO: 7.4 % (ref 0–13.4)
NEUTROPHILS # BLD AUTO: 3.84 K/UL (ref 1.82–7.42)
NEUTROPHILS NFR BLD: 47.5 % (ref 44–72)
NRBC # BLD AUTO: 0 K/UL
NRBC BLD-RTO: 0 /100 WBC (ref 0–0.2)
PLATELET # BLD AUTO: 292 K/UL (ref 164–446)
PMV BLD AUTO: 9.8 FL (ref 9–12.9)
POTASSIUM SERPL-SCNC: 3.3 MMOL/L (ref 3.6–5.5)
RBC # BLD AUTO: 4.29 M/UL (ref 4.2–5.4)
SODIUM SERPL-SCNC: 141 MMOL/L (ref 135–145)
WBC # BLD AUTO: 8.1 K/UL (ref 4.8–10.8)

## 2024-04-07 PROCEDURE — 99231 SBSQ HOSP IP/OBS SF/LOW 25: CPT | Performed by: PSYCHIATRY & NEUROLOGY

## 2024-04-07 PROCEDURE — 80048 BASIC METABOLIC PNL TOTAL CA: CPT

## 2024-04-07 PROCEDURE — 72157 MRI CHEST SPINE W/O & W/DYE: CPT

## 2024-04-07 PROCEDURE — 700105 HCHG RX REV CODE 258

## 2024-04-07 PROCEDURE — 700117 HCHG RX CONTRAST REV CODE 255

## 2024-04-07 PROCEDURE — 770006 HCHG ROOM/CARE - MED/SURG/GYN SEMI*

## 2024-04-07 PROCEDURE — 700105 HCHG RX REV CODE 258: Performed by: STUDENT IN AN ORGANIZED HEALTH CARE EDUCATION/TRAINING PROGRAM

## 2024-04-07 PROCEDURE — A9270 NON-COVERED ITEM OR SERVICE: HCPCS

## 2024-04-07 PROCEDURE — 99232 SBSQ HOSP IP/OBS MODERATE 35: CPT | Mod: FS

## 2024-04-07 PROCEDURE — 85025 COMPLETE CBC W/AUTO DIFF WBC: CPT

## 2024-04-07 PROCEDURE — 700102 HCHG RX REV CODE 250 W/ 637 OVERRIDE(OP)

## 2024-04-07 PROCEDURE — 700102 HCHG RX REV CODE 250 W/ 637 OVERRIDE(OP): Mod: JZ

## 2024-04-07 PROCEDURE — A9579 GAD-BASE MR CONTRAST NOS,1ML: HCPCS

## 2024-04-07 PROCEDURE — A9270 NON-COVERED ITEM OR SERVICE: HCPCS | Mod: JZ

## 2024-04-07 PROCEDURE — 72158 MRI LUMBAR SPINE W/O & W/DYE: CPT

## 2024-04-07 RX ORDER — POTASSIUM CHLORIDE 20 MEQ/1
40 TABLET, EXTENDED RELEASE ORAL ONCE
Status: COMPLETED | OUTPATIENT
Start: 2024-04-07 | End: 2024-04-07

## 2024-04-07 RX ORDER — HYDROXYZINE 50 MG/1
25 TABLET, FILM COATED ORAL NIGHTLY PRN
Status: DISCONTINUED | OUTPATIENT
Start: 2024-04-07 | End: 2024-04-08 | Stop reason: HOSPADM

## 2024-04-07 RX ORDER — SODIUM CHLORIDE 9 MG/ML
INJECTION, SOLUTION INTRAVENOUS CONTINUOUS
Status: DISCONTINUED | OUTPATIENT
Start: 2024-04-07 | End: 2024-04-08 | Stop reason: HOSPADM

## 2024-04-07 RX ADMIN — HYDROXYZINE HYDROCHLORIDE 25 MG: 50 TABLET, FILM COATED ORAL at 22:03

## 2024-04-07 RX ADMIN — SODIUM CHLORIDE: 9 INJECTION, SOLUTION INTRAVENOUS at 06:58

## 2024-04-07 RX ADMIN — GADOTERIDOL 15 ML: 279.3 INJECTION, SOLUTION INTRAVENOUS at 14:22

## 2024-04-07 RX ADMIN — SODIUM CHLORIDE: 9 INJECTION, SOLUTION INTRAVENOUS at 16:16

## 2024-04-07 RX ADMIN — POTASSIUM CHLORIDE 40 MEQ: 1500 TABLET, EXTENDED RELEASE ORAL at 15:23

## 2024-04-07 ASSESSMENT — ENCOUNTER SYMPTOMS
DOUBLE VISION: 0
NAUSEA: 0
FEVER: 0
COUGH: 0
SORE THROAT: 0
FOCAL WEAKNESS: 0
BLURRED VISION: 0
HEADACHES: 1
PALPITATIONS: 0
VOMITING: 0
CHILLS: 0
PHOTOPHOBIA: 0
ABDOMINAL PAIN: 0
NERVOUS/ANXIOUS: 0
NECK PAIN: 1
SHORTNESS OF BREATH: 0
DIZZINESS: 0

## 2024-04-07 ASSESSMENT — PAIN DESCRIPTION - PAIN TYPE: TYPE: ACUTE PAIN

## 2024-04-07 ASSESSMENT — FIBROSIS 4 INDEX: FIB4 SCORE: 0.24

## 2024-04-07 NOTE — PROGRESS NOTES
[facility] Medicine Daily Progress Note    Date of Service  4/7/2024    Chief Complaint  Rachel Villalobos is a 29 y.o. female admitted 4/5/2024 with headache.    Hospital Course  Rachel Villalobos is a 29 y.o. female with no past medical history who presented 4/5/2024 with headaches.     Patient denies any history of headaches in the past or history of migraines.  States that over the last 4 days she is experiencing positional headaches. She states that when she lays down flat, she feels fine. However when she sits up, after 30 seconds she begins to experience headaches located in the frontal and occipital regions.  It is associated with some nausea and vomiting as well.      In the emergency department, vital signs stable.  CT head was unremarkable.  Case was briefly discussed between the ER physician and neurology on-call.  Due to concern for CSF leak, recommending MRI brain with and without contrast at this time.     MRI brain and cervical spine completed which is showing findings consistent with subdural or epidural hygroma.  This may be related to spontaneous or posttraumatic CSF leak.  I have discussed the case with neuro IR and neurosurgery.  At this time neuro IR is recommending MRI of the thoracic and lumbar spine. Potential intervention by neuro IR on Monday.    Patient seen and examined this a.m.  She states that there is not much change from yesterday where getting up still gives her headache but today it is taking longer for the headache onset.  She is denying any numbness, tingling, dizziness, lightheadedness, or any other sensory deficits.  The only positive review of system is headache.      Interval Problem Update  -Patient is still experiencing persistent headache that worsens with positional changes.  -Plan of care: Neuro IR consulted; awaiting further recommendations.  Continue IV fluids per patient's request as she is feeling dehydrated  -Disposition: Patient has been transferred and  patient is awaiting a bed on the neurology floor.    I have discussed this patient's plan of care and discharge plan at IDT rounds today with Case Management, Nursing, Nursing leadership, and other members of the IDT team.    Consultants/Specialty  neurosurgery  Neuro IR  Neurology    Code Status  Full Code    Disposition  The patient is not medically cleared for discharge to home or a post-acute facility.  Anticipate discharge to: home with close outpatient follow-up    I have placed the appropriate orders for post-discharge needs.    Review of Systems  Review of Systems   Constitutional:  Negative for chills, fever and malaise/fatigue.   HENT:  Negative for congestion and sore throat.    Eyes:  Negative for blurred vision, double vision and photophobia.   Respiratory:  Negative for cough and shortness of breath.    Cardiovascular:  Negative for chest pain, palpitations and leg swelling.   Gastrointestinal:  Negative for abdominal pain, nausea and vomiting.   Genitourinary:  Negative for dysuria and frequency.   Musculoskeletal:  Positive for neck pain.   Skin:  Negative for itching and rash.   Neurological:  Positive for headaches. Negative for dizziness and focal weakness.   Psychiatric/Behavioral:  The patient is not nervous/anxious.         Physical Exam  Temp:  [36.6 °C (97.8 °F)-37 °C (98.6 °F)] 36.6 °C (97.8 °F)  Pulse:  [61-93] 61  Resp:  [16] 16  BP: (103-124)/(60-78) 105/67  SpO2:  [92 %-99 %] 92 %    Physical Exam  Constitutional:       Appearance: Normal appearance.   HENT:      Head: Normocephalic and atraumatic.      Nose: Nose normal.      Mouth/Throat:      Mouth: Mucous membranes are moist.   Eyes:      Pupils: Pupils are equal, round, and reactive to light.   Cardiovascular:      Rate and Rhythm: Normal rate and regular rhythm.      Pulses: Normal pulses.      Heart sounds: Normal heart sounds.   Pulmonary:      Effort: Pulmonary effort is normal.      Breath sounds: Normal breath sounds.  "  Abdominal:      General: Bowel sounds are normal.      Palpations: Abdomen is soft.   Musculoskeletal:         General: Normal range of motion.      Cervical back: Normal range of motion.   Skin:     General: Skin is warm.      Capillary Refill: Capillary refill takes less than 2 seconds.   Neurological:      General: No focal deficit present.      Mental Status: She is alert. Mental status is at baseline.   Psychiatric:         Mood and Affect: Mood normal.         Behavior: Behavior normal.         Fluids    Intake/Output Summary (Last 24 hours) at 4/7/2024 1459  Last data filed at 4/6/2024 1800  Gross per 24 hour   Intake 120 ml   Output --   Net 120 ml       Laboratory  Recent Labs     04/05/24  1528 04/06/24  0510 04/07/24  1042   WBC 9.5 11.1* 8.1   RBC 4.90 4.73 4.29   HEMOGLOBIN 15.5 15.1 13.7   HEMATOCRIT 46.9 44.1 41.1   MCV 95.7 93.2 95.8   MCH 31.6 31.9 31.9   MCHC 33.0 34.2 33.3   RDW 44.4 43.3 45.2   PLATELETCT 352 331 292   MPV 10.9 9.7 9.8     Recent Labs     04/05/24  1528 04/06/24  0510 04/07/24  1042   SODIUM 140 139 141   POTASSIUM 4.0 4.3 3.3*   CHLORIDE 107 108 109   CO2 20 15* 21   GLUCOSE 97 149* 129*   BUN 7* 8 10   CREATININE 0.72 0.60 0.76   CALCIUM 9.4 9.4 8.7                   Imaging  MR-THORACIC SPINE-WITH & W/O   Final Result      1.  CSF signal extradural fluid collection consistent with hygroma, primarily dorsal throughout the entire thoracic spine. This is most prominent in the upper thoracic spine measuring up to 7 mm in thickness at the T4-T5 level. At the upper thoracic    levels this results in mild central stenosis with effacement of subarachnoid space in the thecal sac.   2.  No appreciable source of this \"CSF leak\". No arachnoid cyst, dural ectasia, perineural cyst/Tarlov cyst is evident at any thoracic level.   3.  No significant degenerative changes. No significant disc bulge or protrusion or foraminal stenosis at any thoracic level.      MR-LUMBAR SPINE-WITH & W/O " "  Final Result      1.  CSF signal subdural or epidural hygroma primarily dorsal around the thecal sac. On this scan, this can be seen from the T12 level extending down to about L3-4. The collection measures up to about 3 mm in thickness in the dorsal midline at the T12    level.   2.  No appreciable arachnoid cyst, dural ectasia, or perineural/Tarlov cyst as a source for this presumed \"CSF leak\"   3.  No significant degenerative changes. No significant disc bulge or protrusion, central stenosis, or foraminal stenosis at any lumbar level.   4.  Incidentally noted 59 mm cyst in the left paramedian pelvis most consistent with an ovarian cyst. This could be further evaluated with ultrasound if clinically indicated.      MR-CERVICAL SPINE-WITH & W/O   Final Result      1.  Rim of extradural CSF signal fluid surrounding the thecal sac extending from about C3-C4 down to the T2 level at the bottom of the field-of-view. Thin rim of corresponding primarily dorsal dural enhancement. Findings are most consistent with subdural    or epidural hygroma. As clinically suspected, this may be related to a spontaneous or posttraumatic CSF leak (assuming the patient has not had a recent lumbar puncture).   2.  No appreciable arachnoid cyst or perineural cyst at the cervical levels as a potential source for CSF leakage.   3.  No significant degenerative changes.   4.  No myelopathic cord signal.      MR-BRAIN-WITH & W/O   Final Result      1.  MRI OF THE BRAIN WITHOUT AND WITH CONTRAST WITHIN NORMAL LIMITS.   2.  REGARDING THE CLINICAL CONCERN FOR \"CSF LEAK\", NO EXTRA-AXIAL FLUID COLLECTIONS, BRAIN SAGGING, OR OTHER STIGMATA OF INTRACRANIAL HYPOTENSION.      CT-HEAD W/O   Final Result         1. No acute intracranial abnormality. No evidence of acute intracranial hemorrhage or mass lesion.                          Assessment/Plan  * Nonintractable headache, unspecified chronicity pattern, unspecified headache type- (present on " admission)  Assessment & Plan  CT head unremarkable  MRI brain, cervical, thoracic, lumbar spine with and without completed   Tylenol and ibuprofen ordered as needed for headaches    Hygroma- (present on admission)  Assessment & Plan  Neuro IR consulted awaiting further recommendation  MRI brain, cervical, thoracic, lumbar spine with and without completed showing hygroma from C3-4 to L3-4 and upper thoracic levels showing mild central stenosis with effacement of subarachnoid space in the thecal sac     Nausea & vomiting  Assessment & Plan  Positional in nature.  Continue with IV and p.o. antiemetics       VTE prophylaxis:   SCDs/TEDs    I have performed a physical exam and reviewed and updated ROS and Plan today (4/7/2024). In review of yesterday's note (4/6/2024), there are no changes except as documented above.      ISummer A.P.R.N. performed a substantiated portion of the service face-to-face with same patient on the same date of service INDEPENDENTLY FROM THE MD ON ASSESSMENT, EXAMINATION, AND DISCUSSION IN PLAN OF CARE FOR 21 MINUTES.  I was personally involved in reviewing and conducting the medical decision making, including the information as described below:

## 2024-04-07 NOTE — PROGRESS NOTES
"Referring Physician: JERICHO Carrion    S:  MRI of the T and L-spine were completed. Headache is slightly better at rest laying mostly flat (3/10). Significantly worse with sitting up or standing. No new neurology symptoms.    Neurosurgery evaluated the patient. No acute neurosurgical intervention. Neuro-IR is following along.     O:    Vitals:    04/07/24 1548   BP: 127/77   Pulse: 68   Resp: 16   Temp: 36.6 °C (97.8 °F)   SpO2: 96%     Awake, alert and interactive. Attention is intact. Conversant.  Language is fluent with intact comprehension. No dysarthria.  Good eye contact. Visually tracks the examiner.  Face appears symmetric.  Hearing is intact to conversation.  Moves all extremities symmetrically.    MR-THORACIC SPINE-WITH & W/O   Final Result      1.  CSF signal extradural fluid collection consistent with hygroma, primarily dorsal throughout the entire thoracic spine. This is most prominent in the upper thoracic spine measuring up to 7 mm in thickness at the T4-T5 level. At the upper thoracic    levels this results in mild central stenosis with effacement of subarachnoid space in the thecal sac.   2.  No appreciable source of this \"CSF leak\". No arachnoid cyst, dural ectasia, perineural cyst/Tarlov cyst is evident at any thoracic level.   3.  No significant degenerative changes. No significant disc bulge or protrusion or foraminal stenosis at any thoracic level.      MR-LUMBAR SPINE-WITH & W/O   Final Result      1.  CSF signal subdural or epidural hygroma primarily dorsal around the thecal sac. On this scan, this can be seen from the T12 level extending down to about L3-4. The collection measures up to about 3 mm in thickness in the dorsal midline at the T12    level.   2.  No appreciable arachnoid cyst, dural ectasia, or perineural/Tarlov cyst as a source for this presumed \"CSF leak\"   3.  No significant degenerative changes. No significant disc bulge or protrusion, central stenosis, or foraminal " "stenosis at any lumbar level.   4.  Incidentally noted 59 mm cyst in the left paramedian pelvis most consistent with an ovarian cyst. This could be further evaluated with ultrasound if clinically indicated.      MR-CERVICAL SPINE-WITH & W/O   Final Result      1.  Rim of extradural CSF signal fluid surrounding the thecal sac extending from about C3-C4 down to the T2 level at the bottom of the field-of-view. Thin rim of corresponding primarily dorsal dural enhancement. Findings are most consistent with subdural    or epidural hygroma. As clinically suspected, this may be related to a spontaneous or posttraumatic CSF leak (assuming the patient has not had a recent lumbar puncture).   2.  No appreciable arachnoid cyst or perineural cyst at the cervical levels as a potential source for CSF leakage.   3.  No significant degenerative changes.   4.  No myelopathic cord signal.      MR-BRAIN-WITH & W/O   Final Result      1.  MRI OF THE BRAIN WITHOUT AND WITH CONTRAST WITHIN NORMAL LIMITS.   2.  REGARDING THE CLINICAL CONCERN FOR \"CSF LEAK\", NO EXTRA-AXIAL FLUID COLLECTIONS, BRAIN SAGGING, OR OTHER STIGMATA OF INTRACRANIAL HYPOTENSION.      CT-HEAD W/O   Final Result         1. No acute intracranial abnormality. No evidence of acute intracranial hemorrhage or mass lesion.                         Impression & Recommendations:  Severe positional headache. Presumed spontaneous CSF leak. Interval spine imaging demonstrates extradural CSF signal from upper cervical to about L3-L4. No clear source identified. Hospitalist team in contact with neuro-IR or anesthesia to consider blood patch. Neurology will follow along peripherally. Please reach out with any questions.     I provided a total of 25 minutes of acute neurologic care for this patient encounter reviewing medical records, diagnostic studies, direct face-to-face time with the patient and/or family, documentation, communicating and coordinating plan of care.      Steven BONNER" MD [facility]  Neurohospitalist, [facility]          Please note that this dictation was created using voice recognition software.  I have made every reasonable attempt to correct obvious errors, but I expect that there are errors of grammar and possibly content that I did not discover before finalizing the note.

## 2024-04-07 NOTE — CONSULTS
Neurology Initial Consult H&P  Neurohospitalist Service, [facility]    Referring Physician: RUTH Carrion*    Chief Complaint   Patient presents with    Headache     Upper back pain that started on Tuesday when pt woke up. Pt started to radiate up her neck and into her head on Wednesday. Pt rates headache at 9/10 and is worst in the front of her head. +nausea due to pain. Pt states that it hurts to turn her head. Pt has some relief when laying flat.        HPI: Rachel Villalobos is a 29 y.o. woman presenting with severe positional headache.    Patient reports that she has no history of headaches.  On Tuesday morning she woke up with back and neck discomfort that she thought was related to sleeping position.  On Wednesday she noticed a positional headache significantly worse when sitting up or standing.  Described as severe and 10 out of 10 with sharp pressure type pain in the frontal region.  Headache is significantly better when laying flat but does not completely resolve and is milder at a 4 out of 10.  She denies any preceding illness.  No fevers or chills.  No trauma.  Of note she was visiting family in [place] over the weekend.  There was some light dancing on Saturday evening but nothing strenuous.  Last time she was ill was in February described as a bad cold and GI illness.  No illness since that time.  Never had similar symptoms in the past and no known family history of similar symptoms.    No vision changes.  Had a brief period of left ear fullness.  No pulsatile tinnitus.  No motor or sensory symptoms.    Review of systems: In addition to what is detailed in the HPI above, all other systems reviewed and are negative.    Past Medical History:    has a past medical history of Gastric cyst (01/01/2020).    FHx:  family history is not on file.    SHx:   reports that she quit smoking about 6 years ago. Her smoking use included cigarettes. She started smoking  "about 8 years ago. She has a 2 pack-year smoking history. She uses smokeless tobacco. She reports current alcohol use of about 3.0 oz of alcohol per week. She reports that she does not currently use drugs after having used the following drugs: Marijuana and Inhaled. Frequency: 3.00 times per week.    Allergies:  No Known Allergies    Medications:    Current Facility-Administered Medications:     SUMAtriptan (Imitrex) tablet 25 mg, 25 mg, Oral, Q2HRS PRN, Shefali Davalos A.P.RPEYTON    butalbital/apap/caffeine (Fioricet) -40 mg per tablet 1 Tablet, 1 Tablet, Oral, Q6HRS PRN, MELY Powell.P.R.N., 1 Tablet at 04/06/24 0511    NS infusion, , Intravenous, Continuous, Will Rahman M.D.    enoxaparin (Lovenox) inj 40 mg, 40 mg, Subcutaneous, DAILY AT 1800, Gary Sorensen M.D., 40 mg at 04/05/24 2041    acetaminophen (Tylenol) tablet 650 mg, 650 mg, Oral, Q6HRS PRN, Gary Sorensen M.D.    ondansetron (Zofran) syringe/vial injection 4 mg, 4 mg, Intravenous, Q4HRS PRN, Gary Sorensen M.D.    ondansetron (Zofran ODT) dispertab 4 mg, 4 mg, Oral, Q4HRS PRN, Gary Sorensen M.D.    promethazine (Phenergan) tablet 12.5-25 mg, 12.5-25 mg, Oral, Q4HRS PRN, Gary Sorensen M.D.    promethazine (Phenergan) suppository 12.5-25 mg, 12.5-25 mg, Rectal, Q4HRS PRN, Gary Sorensen M.D.    prochlorperazine (Compazine) injection 5-10 mg, 5-10 mg, Intravenous, Q4HRS PRN, Gary Sorensen M.D.    ibuprofen (Motrin) tablet 600 mg, 600 mg, Oral, Q6HRS PRN, Gary Sorensen, M.D., 600 mg at 04/05/24 2041    Physical Examination:     General: Patient is awake, alert and interactive. Non-toxic appearing.   Eye: Examination of optic disks not indicated at this time given acuity of consult  Neck: There is normal range of motion  CV: regular rate   Extremities:  clear, dry, intact, without peripheral edema    NEUROLOGICAL EXAM:     /78   Pulse 93   Temp 36.6 °C (97.9 °F) (Temporal)   Resp 16   Ht 1.702 m (5' 7\")   Wt 80.1 " "kg (176 lb 9.4 oz)   LMP 04/01/2024   SpO2 95%   BMI 27.66 kg/m²       Mental status: Awake, alert and oriented.  Attention is intact.  Answers questions appropriately.  Language: Fluent with intact comprehension.  No dysarthria.  Cranial nerves:    Pupils are equal, round and reactive to light  Intact visual fields  Versions are full   Intact muscles of mastication    Intact facial sensation   Face appears symmetric   Hearing is intact to conversation   Symmetric shoulder shrug   Symmetric elevation of the palate; uvula appears midline   Tongue protrusion is midline  Motor: Normal bulk and tone.  Moves all extremities symmetrically.  Antigravity without drift.  No abnormal movements or postures.   Reflexes: Deep tendon reflexes 2 and symmetric in the bilateral upper and lower extremities. Bilateral plantar responses are flexor.   Sensory: Intact to light-touch.  Coordination: No dysmetria.  Gait: Not tested.     Objective Data:    Labs:  No results found for: \"PROTHROMBTM\", \"INR\"   Lab Results   Component Value Date/Time    WBC 11.1 (H) 04/06/2024 05:10 AM    RBC 4.73 04/06/2024 05:10 AM    HEMOGLOBIN 15.1 04/06/2024 05:10 AM    HEMATOCRIT 44.1 04/06/2024 05:10 AM    MCV 93.2 04/06/2024 05:10 AM    MCH 31.9 04/06/2024 05:10 AM    MCHC 34.2 04/06/2024 05:10 AM    MPV 9.7 04/06/2024 05:10 AM    NEUTSPOLYS 69.30 04/05/2024 03:28 PM    LYMPHOCYTES 23.20 04/05/2024 03:28 PM    MONOCYTES 6.50 04/05/2024 03:28 PM    EOSINOPHILS 0.30 04/05/2024 03:28 PM    BASOPHILS 0.40 04/05/2024 03:28 PM      Lab Results   Component Value Date/Time    SODIUM 139 04/06/2024 05:10 AM    POTASSIUM 4.3 04/06/2024 05:10 AM    CHLORIDE 108 04/06/2024 05:10 AM    CO2 15 (L) 04/06/2024 05:10 AM    GLUCOSE 149 (H) 04/06/2024 05:10 AM    BUN 8 04/06/2024 05:10 AM    CREATININE 0.60 04/06/2024 05:10 AM      No results found for: \"CHOLSTRLTOT\", \"LDL\", \"HDL\", \"TRIGLYCERIDE\"    Lab Results   Component Value Date/Time    ALKPHOSPHAT 79 04/06/2024 " "05:10 AM    ASTSGOT 11 (L) 04/06/2024 05:10 AM    ALTSGPT 20 04/06/2024 05:10 AM    TBILIRUBIN 0.3 04/06/2024 05:10 AM        Imaging/Testing:    I interpreted and/or reviewed the patient's neuroimaging    MR-CERVICAL SPINE-WITH & W/O   Final Result      1.  Rim of extradural CSF signal fluid surrounding the thecal sac extending from about C3-C4 down to the T2 level at the bottom of the field-of-view. Thin rim of corresponding primarily dorsal dural enhancement. Findings are most consistent with subdural    or epidural hygroma. As clinically suspected, this may be related to a spontaneous or posttraumatic CSF leak (assuming the patient has not had a recent lumbar puncture).   2.  No appreciable arachnoid cyst or perineural cyst at the cervical levels as a potential source for CSF leakage.   3.  No significant degenerative changes.   4.  No myelopathic cord signal.      MR-BRAIN-WITH & W/O   Final Result      1.  MRI OF THE BRAIN WITHOUT AND WITH CONTRAST WITHIN NORMAL LIMITS.   2.  REGARDING THE CLINICAL CONCERN FOR \"CSF LEAK\", NO EXTRA-AXIAL FLUID COLLECTIONS, BRAIN SAGGING, OR OTHER STIGMATA OF INTRACRANIAL HYPOTENSION.      CT-HEAD W/O   Final Result         1. No acute intracranial abnormality. No evidence of acute intracranial hemorrhage or mass lesion.                     MR-LUMBAR SPINE-WITH & W/O    (Results Pending)   MR-THORACIC SPINE-WITH & W/O    (Results Pending)       Impression and Recommendations: Rachel Villalobos is a 29 y.o. woman presenting with severe positional headache. Spontaneous intracranial hypotension (CSF leak).  Extradural CSF signal from C3-T2 as described.  Unremarkable MRI of the brain.  MRI of the thoracic spine and lumbar spine with and without contrast are pending.  Anticipate completion tomorrow afternoon.  Continue positional therapy/bed rest for now.  Encourage oral caffeine intake.  Likely therapeutic trial with epidural blood patch pending additional MRI data. Neurology " will follow along.     Steven Nagy MD  Neurohospitalist, Acute Care Services      The evaluation of the patient, and recommended management, was discussed with JERICHO Regalado     I personally provided 65 minutes of total acute neurologic care time outside of time spent on separately billable/documented procedures. Time includes: review of laboratory data, review of radiology studies, discussion with consultants, discussion with family/patient, monitoring for potential decompensation.  Interventions were performed as documented in the chart.        Please note that this dictation was created using voice recognition software.  I have made every reasonable attempt to correct obvious errors, but I expect that there are errors of grammar and possibly content that I did not discover before finalizing the note.

## 2024-04-07 NOTE — CONSULTS
Neurosurgery Consult Note    Patient: Rachel Villalobos MRN: [id number]    Date of Consultation: 4/6/2024    Reason for Consultation: low pressure headaches    Referring Physician: JERICHO Doll Hospitalist    History of Present Illness:  The patient is a 29 y.o. female presenting with positional headaches for several days. Five days ago, she woke up with back and neck discomfort that she thought was related to sleeping position. Four days ago, she noticed a positional headache significantly worse when sitting up or standing. Described as severe and 10 out of 10 with sharp pressure type pain in the frontal region. Headache is significantly better when laying flat but does not completely resolve and is milder at a 4 out of 10. She denies any preceding illness. No fevers or chills. No trauma. Presented to [facility] ED, admitted for workup. MRI C-spine revealed possible extraaxial hygroma, neurosurgery consulted. Neurology following.    She is sitting semirecumbent, states that the nausea worsens only when she is completely upright, and that is subsiding some.    Medications:  Current Facility-Administered Medications   Medication Dose Route Frequency Provider Last Rate Last Admin    SUMAtriptan (Imitrex) tablet 25 mg  25 mg Oral Q2HRS PRN Shefali Davalos, A.P.R.N.        butalbital/apap/caffeine (Fioricet) -40 mg per tablet 1 Tablet  1 Tablet Oral Q6HRS PRN Shefali Davalos, A.P.R.N.   1 Tablet at 04/06/24 0511    NS infusion   Intravenous Continuous Will Rahman M.D. 75 mL/hr at 04/06/24 1806 New Bag at 04/06/24 1806    acetaminophen (Tylenol) tablet 650 mg  650 mg Oral Q6HRS PRN Gary Sorensen M.D.        ondansetron (Zofran) syringe/vial injection 4 mg  4 mg Intravenous Q4HRS PRDELIA Sorensen M.D.        ondansetron (Zofran ODT) dispertab 4 mg  4 mg Oral Q4HRS PRN Gary Sorensen M.D.        promethazine (Phenergan) tablet 12.5-25 mg  12.5-25 mg Oral Q4HRS PRDELIA Sorensen M.D.         promethazine (Phenergan) suppository 12.5-25 mg  12.5-25 mg Rectal Q4HRS PRDELIA Sorensen M.D.        prochlorperazine (Compazine) injection 5-10 mg  5-10 mg Intravenous Q4HRS PRN Gary Sorensen M.D.        ibuprofen (Motrin) tablet 600 mg  600 mg Oral Q6HRS PRDELIA Sorensen M.D.   600 mg at 24       Allergies:  No Known Allergies    Past Medical History:  Past Medical History:   Diagnosis Date    Gastric cyst 2020       Past Surgical History:  History reviewed. No pertinent surgical history.    Family History:  History reviewed. No pertinent family history.    Social History:  Social History     Socioeconomic History    Marital status: Single     Spouse name: Not on file    Number of children: Not on file    Years of education: Not on file    Highest education level: Not on file   Occupational History    Not on file   Tobacco Use    Smoking status: Former     Current packs/day: 0.00     Average packs/day: 1 pack/day for 2.0 years (2.0 ttl pk-yrs)     Types: Cigarettes     Start date: 2016     Quit date: 2018     Years since quittin.2    Smokeless tobacco: Current    Tobacco comments:     Synthetic nicotine 3mg packs 8-10 per day   Vaping Use    Vaping Use: Never used   Substance and Sexual Activity    Alcohol use: Yes     Alcohol/week: 3.0 oz     Types: 5 Shots of liquor per week    Drug use: Not Currently     Frequency: 3.0 times per week     Types: Marijuana, Inhaled    Sexual activity: Yes     Partners: Male   Other Topics Concern    Not on file   Social History Narrative    Not on file     Social Determinants of Health     Financial Resource Strain: Not on file   Food Insecurity: Not on file   Transportation Needs: Not on file   Physical Activity: Not on file   Stress: Not on file   Social Connections: Not on file   Intimate Partner Violence: Not on file   Housing Stability: Not on file       Physical Examination:  Vitals:    24   BP: 124/60   Pulse: 69   Resp: 16    Temp: 37 °C (98.6 °F)   SpO2: 97%     The patient is resting comfortably in bed in no acute distress.    Neurological  Awake, alert, oriented x3. CN II-XII intact.    Motor  RUE  LUE  Deltoid      5/5     5/5  Biceps         5/5     5/5  Triceps         5/5     5/5  Wrist flexion     5/5     5/5  Wrist extension    5/5     5/5  Finger flexion     5/5     5/5  Interossei     5/5     5/5      RLE  LLE  Iliopsoas     5/5     5/5  Quadriceps     5/5     5/5   Dorsiflexion                   5/5     5/5  Eversion                        5/5     5/5   EHL                               5/5     5/5  Plantarflexion                 5/5     5/5  Hamstrings                     5/5     5/5    Sensation  Sensation to light touch intact in all sensory dermatomes in four extremities.    Reflexes       RUE  LUE  Biceps    2/4+     2/4+  Triceps    2/4+     2/4+  Brachioradialis   2/4+     2/4+          RLE  LLE  Patella    2/4+     2/4+  Achilles   2/4+     2/4+    No Yepez's  No clonus    Labs:  Recent Labs     04/05/24  1528 04/06/24  0510   WBC 9.5 11.1*   RBC 4.90 4.73   HEMOGLOBIN 15.5 15.1   HEMATOCRIT 46.9 44.1   MCV 95.7 93.2   MCH 31.6 31.9   MCHC 33.0 34.2   RDW 44.4 43.3   PLATELETCT 352 331   MPV 10.9 9.7     Recent Labs     04/05/24  1528 04/06/24  0510   SODIUM 140 139   POTASSIUM 4.0 4.3   CHLORIDE 107 108   CO2 20 15*   GLUCOSE 97 149*   BUN 7* 8   CREATININE 0.72 0.60   CALCIUM 9.4 9.4               Intake/Output                         04/05/24 0700 - 04/06/24 0659 04/06/24 0700 - 04/07/24 0659 0700-1859 1900-0659 Total 0700-1859 1900-0659 Total                 Intake    P.O.  --  -- --  360  -- 360    P.O. -- -- -- 360 -- 360    Total Intake -- -- -- 360 -- 360       Output    Stool  --  -- --  --  -- --    Number of Times Stooled -- -- -- -- 0 x 0 x    Total Output -- -- -- -- -- --       Net I/O     -- -- -- 360 -- 360            Imaging:  MRI brain with and without contrast dated 4/6/2024 was  independently reviewed in detail,  and my interpretation is as follows. No acute findings.    MRI C-spine with and without contrast dated 4/6/2024 was independently reviewed in detail,  and my interpretation is as follows. 1.  Rim of extradural CSF signal fluid surrounding the thecal sac extending from about C3-C4 down to the T2 level at the bottom of the field-of-view. Thin rim of corresponding primarily dorsal dural enhancement. Findings are most consistent with subdural   or epidural hygroma. As clinically suspected, this may be related to a spontaneous or posttraumatic CSF leak (assuming the patient has not had a recent lumbar puncture).  2.  No appreciable arachnoid cyst or perineural cyst at the cervical levels as a potential source for CSF leakage.  3.  No significant degenerative changes.  4.  No myelopathic cord signal.    Assessment and Plan:    Rachel Villalobos is a 29 y.o. female presenting with positional headaches concerning for occult CSF leak.  Pending full workup with T and L MRIs.    Chemical prophylactic DVT therapy: No  Start date/time: pending plan    Recommendations:  - No acute neurosurgical intervention at this time.  -Will follow peripherally    Thank you for this consult. Please call with questions.    Anneliese Nixon M.D.  [facility]  [address] [place] [address], [place] [address] [phone]    A total of 45 minutes were spent in the evaluation, examination, coordination of care, review of labs and imaging of this patient. I spent >50% of time face-to-face on patient counseling.

## 2024-04-07 NOTE — CARE PLAN
The patient is Stable - Low risk of patient condition declining or worsening    Shift Goals  Clinical Goals: MRI lumbar thoraic, pending neuro reccs  Patient Goals: Sleep, pain relief  Family Goals: JEFRY      Problem: Pain - Standard  Goal: Alleviation of pain or a reduction in pain to the patient’s comfort goal  Description: Target End Date:  Prior to discharge or change in level of care    Document on Vitals flowsheet    1.  Document pain using the appropriate pain scale per order or unit policy  2.  Educate and implement non-pharmacologic comfort measures (i.e. relaxation, distraction, massage, cold/heat therapy, etc.)  3.  Pain management medications as ordered  4.  Reassess pain after pain med administration per policy  5.  If opiods administered assess patient's response to pain medication is appropriate per POSS sedation scale  6.  Follow pain management plan developed in collaboration with patient and interdisciplinary team (including palliative care or pain specialists if applicable)  Outcome: Progressing     Problem: Knowledge Deficit - Standard  Goal: Patient and family/care givers will demonstrate understanding of plan of care, disease process/condition, diagnostic tests and medications  Description: Target End Date:  1-3 days or as soon as patient condition allows    Document in Patient Education    1.  Patient and family/caregiver oriented to unit, equipment, visitation policy and means for communicating concern  2.  Complete/review Learning Assessment  3.  Assess knowledge level of disease process/condition, treatment plan, diagnostic tests and medications  4.  Explain disease process/condition, treatment plan, diagnostic tests and medications  Outcome: Progressing

## 2024-04-08 ENCOUNTER — PHARMACY VISIT (OUTPATIENT)
Dept: PHARMACY | Facility: MEDICAL CENTER | Age: 29
End: 2024-04-08
Payer: MEDICARE

## 2024-04-08 ENCOUNTER — APPOINTMENT (OUTPATIENT)
Dept: RADIOLOGY | Facility: MEDICAL CENTER | Age: 29
DRG: 093 | End: 2024-04-08
Attending: RADIOLOGY
Payer: COMMERCIAL

## 2024-04-08 VITALS
TEMPERATURE: 98.1 F | OXYGEN SATURATION: 97 % | BODY MASS INDEX: 27.72 KG/M2 | DIASTOLIC BLOOD PRESSURE: 68 MMHG | WEIGHT: 176.59 LBS | HEIGHT: 67 IN | HEART RATE: 70 BPM | SYSTOLIC BLOOD PRESSURE: 117 MMHG | RESPIRATION RATE: 16 BRPM

## 2024-04-08 LAB
ANION GAP SERPL CALC-SCNC: 11 MMOL/L (ref 7–16)
BUN SERPL-MCNC: 7 MG/DL (ref 8–22)
CALCIUM SERPL-MCNC: 8.8 MG/DL (ref 8.5–10.5)
CHLORIDE SERPL-SCNC: 108 MMOL/L (ref 96–112)
CO2 SERPL-SCNC: 20 MMOL/L (ref 20–33)
CREAT SERPL-MCNC: 0.56 MG/DL (ref 0.5–1.4)
GFR SERPLBLD CREATININE-BSD FMLA CKD-EPI: 126 ML/MIN/1.73 M 2
GLUCOSE SERPL-MCNC: 89 MG/DL (ref 65–99)
POTASSIUM SERPL-SCNC: 4.2 MMOL/L (ref 3.6–5.5)
SODIUM SERPL-SCNC: 139 MMOL/L (ref 135–145)

## 2024-04-08 PROCEDURE — 99152 MOD SED SAME PHYS/QHP 5/>YRS: CPT

## 2024-04-08 PROCEDURE — 700111 HCHG RX REV CODE 636 W/ 250 OVERRIDE (IP): Mod: JZ | Performed by: RADIOLOGY

## 2024-04-08 PROCEDURE — RXMED WILLOW AMBULATORY MEDICATION CHARGE: Performed by: HOSPITALIST

## 2024-04-08 PROCEDURE — 700111 HCHG RX REV CODE 636 W/ 250 OVERRIDE (IP)

## 2024-04-08 PROCEDURE — 80048 BASIC METABOLIC PNL TOTAL CA: CPT

## 2024-04-08 PROCEDURE — A9270 NON-COVERED ITEM OR SERVICE: HCPCS | Performed by: STUDENT IN AN ORGANIZED HEALTH CARE EDUCATION/TRAINING PROGRAM

## 2024-04-08 PROCEDURE — 99239 HOSP IP/OBS DSCHRG MGMT >30: CPT | Performed by: HOSPITALIST

## 2024-04-08 PROCEDURE — 700117 HCHG RX CONTRAST REV CODE 255: Performed by: RADIOLOGY

## 2024-04-08 PROCEDURE — 99231 SBSQ HOSP IP/OBS SF/LOW 25: CPT | Performed by: PSYCHIATRY & NEUROLOGY

## 2024-04-08 PROCEDURE — 62273 INJECT EPIDURAL PATCH: CPT

## 2024-04-08 PROCEDURE — 3E0R3GC INTRODUCTION OF OTHER THERAPEUTIC SUBSTANCE INTO SPINAL CANAL, PERCUTANEOUS APPROACH: ICD-10-PCS | Performed by: RADIOLOGY

## 2024-04-08 PROCEDURE — 700102 HCHG RX REV CODE 250 W/ 637 OVERRIDE(OP): Performed by: STUDENT IN AN ORGANIZED HEALTH CARE EDUCATION/TRAINING PROGRAM

## 2024-04-08 RX ORDER — SODIUM CHLORIDE 9 MG/ML
500 INJECTION, SOLUTION INTRAVENOUS
Status: ACTIVE | OUTPATIENT
Start: 2024-04-08 | End: 2024-04-08

## 2024-04-08 RX ORDER — MIDAZOLAM HYDROCHLORIDE 1 MG/ML
.5-2 INJECTION INTRAMUSCULAR; INTRAVENOUS PRN
Status: ACTIVE | OUTPATIENT
Start: 2024-04-08 | End: 2024-04-08

## 2024-04-08 RX ORDER — MIDAZOLAM HYDROCHLORIDE 1 MG/ML
INJECTION INTRAMUSCULAR; INTRAVENOUS
Status: COMPLETED
Start: 2024-04-08 | End: 2024-04-08

## 2024-04-08 RX ORDER — ONDANSETRON 4 MG/1
4 TABLET, ORALLY DISINTEGRATING ORAL EVERY 4 HOURS PRN
Qty: 10 TABLET | Refills: 0 | Status: SHIPPED | OUTPATIENT
Start: 2024-04-08

## 2024-04-08 RX ORDER — ONDANSETRON 2 MG/ML
4 INJECTION INTRAMUSCULAR; INTRAVENOUS PRN
Status: ACTIVE | OUTPATIENT
Start: 2024-04-08 | End: 2024-04-08

## 2024-04-08 RX ORDER — SODIUM CHLORIDE 9 MG/ML
INJECTION, SOLUTION INTRAVENOUS CONTINUOUS
Status: DISCONTINUED | OUTPATIENT
Start: 2024-04-08 | End: 2024-04-08 | Stop reason: HOSPADM

## 2024-04-08 RX ADMIN — IBUPROFEN 600 MG: 400 TABLET, FILM COATED ORAL at 13:09

## 2024-04-08 RX ADMIN — MIDAZOLAM HYDROCHLORIDE 1 MG: 1 INJECTION INTRAMUSCULAR; INTRAVENOUS at 09:45

## 2024-04-08 RX ADMIN — FENTANYL CITRATE 50 MCG: 50 INJECTION, SOLUTION INTRAMUSCULAR; INTRAVENOUS at 09:50

## 2024-04-08 RX ADMIN — MIDAZOLAM HYDROCHLORIDE 2 MG: 1 INJECTION, SOLUTION INTRAMUSCULAR; INTRAVENOUS at 09:39

## 2024-04-08 RX ADMIN — FENTANYL CITRATE 50 MCG: 50 INJECTION, SOLUTION INTRAMUSCULAR; INTRAVENOUS at 09:38

## 2024-04-08 RX ADMIN — IOHEXOL 5 ML: 300 INJECTION, SOLUTION INTRAVENOUS at 10:30

## 2024-04-08 RX ADMIN — MIDAZOLAM HYDROCHLORIDE 2 MG: 1 INJECTION INTRAMUSCULAR; INTRAVENOUS at 09:39

## 2024-04-08 RX ADMIN — ACETAMINOPHEN 650 MG: 325 TABLET ORAL at 13:09

## 2024-04-08 RX ADMIN — MIDAZOLAM HYDROCHLORIDE 1 MG: 1 INJECTION, SOLUTION INTRAMUSCULAR; INTRAVENOUS at 09:45

## 2024-04-08 RX ADMIN — FENTANYL CITRATE 50 MCG: 50 INJECTION, SOLUTION INTRAMUSCULAR; INTRAVENOUS at 09:42

## 2024-04-08 RX ADMIN — FENTANYL CITRATE 50 MCG: 50 INJECTION, SOLUTION INTRAMUSCULAR; INTRAVENOUS at 09:54

## 2024-04-08 ASSESSMENT — ENCOUNTER SYMPTOMS
SENSORY CHANGE: 0
NECK PAIN: 1
TINGLING: 0
DIZZINESS: 0
SORE THROAT: 0
DOUBLE VISION: 0
PHOTOPHOBIA: 0
CHILLS: 0
PALPITATIONS: 0
HEADACHES: 1
VOMITING: 0
FALLS: 0
NAUSEA: 1
SHORTNESS OF BREATH: 0
COUGH: 0
NERVOUS/ANXIOUS: 0
BLURRED VISION: 0
ABDOMINAL PAIN: 0
FEVER: 0
WEAKNESS: 0

## 2024-04-08 ASSESSMENT — COGNITIVE AND FUNCTIONAL STATUS - GENERAL
DAILY ACTIVITIY SCORE: 24
SUGGESTED CMS G CODE MODIFIER MOBILITY: CH
SUGGESTED CMS G CODE MODIFIER DAILY ACTIVITY: CH
MOBILITY SCORE: 24

## 2024-04-08 ASSESSMENT — PAIN DESCRIPTION - PAIN TYPE
TYPE: SURGICAL PAIN
TYPE: ACUTE PAIN

## 2024-04-08 NOTE — PROGRESS NOTES
[facility] Medicine Daily Progress Note    Date of Service  4/8/2024    Chief Complaint  Rachel Villalobos is a 29 y.o. female admitted 4/5/2024 with headaches.    Hospital Course  Rachel Villalobos is a 29 y.o. female with no past medical history who presented 4/5/2024 with headaches.     Patient denies any history of headaches in the past or history of migraines.  States that over the last 4 days she is experiencing positional headaches. She states that when she lays down flat, she feels fine. However when she sits up, after 30 seconds she begins to experience headaches located in the frontal and occipital regions.  It is associated with some nausea and vomiting as well.      In the emergency department, vital signs stable.  CT head was unremarkable.  Case was briefly discussed between the ER physician and neurology on-call.  Due to concern for CSF leak, recommending MRI brain with and without contrast at this time.     MRI brain and cervical spine completed which is showing findings consistent with subdural or epidural hygroma.  This may be related to spontaneous or posttraumatic CSF leak.  I have discussed the case with neuro IR and neurosurgery.  At this time neuro IR is recommending MRI of the thoracic and lumbar spine. Potential intervention by neuro IR on Monday.    Patient seen and examined this a.m.  She states that there is not much change from yesterday where getting up still gives her headache but today it is taking longer for the headache onset.  She is denying any numbness, tingling, dizziness, lightheadedness, or any other sensory deficits.  The only positive review of system is headache.      Interval Problem Update  Patient is afebrile.    She has a throbbing headache bifrontal constant intensity.  2 out of 10    Plan for a blood patch today    I have discussed this patient's plan of care and discharge plan at IDT rounds today with Case Management, Nursing, Nursing leadership, and other  members of the IDT team.    Consultants/Specialty  neurosurgery (Chart Review)  Neuro IR    Code Status  Full Code    Disposition  The patient is not medically cleared for discharge to home or a post-acute facility.  Anticipate discharge to: home with close outpatient follow-up    I have placed the appropriate orders for post-discharge needs.    Review of Systems  Review of Systems   Constitutional:  Negative for chills, fever and malaise/fatigue.   HENT:  Negative for congestion and sore throat.    Eyes:  Negative for blurred vision, double vision and photophobia.   Respiratory:  Negative for cough and shortness of breath.    Cardiovascular:  Negative for chest pain, palpitations and leg swelling.   Gastrointestinal:  Positive for nausea. Negative for abdominal pain and vomiting.   Genitourinary:  Negative for dysuria and frequency.   Musculoskeletal:  Positive for neck pain. Negative for falls.   Skin:  Negative for itching and rash.   Neurological:  Positive for headaches. Negative for dizziness, tingling, sensory change and weakness.   Psychiatric/Behavioral:  The patient is not nervous/anxious.         Physical Exam  Temp:  [36.1 °C (97 °F)-36.8 °C (98.2 °F)] 36.4 °C (97.6 °F)  Pulse:  [60-80] 72  Resp:  [6-18] 15  BP: (117-143)/(67-96) 133/84  SpO2:  [93 %-99 %] 93 %    Physical Exam  Constitutional:       Appearance: Normal appearance.   HENT:      Head: Normocephalic and atraumatic.      Nose: Nose normal.      Mouth/Throat:      Mouth: Mucous membranes are moist.   Eyes:      Pupils: Pupils are equal, round, and reactive to light.   Cardiovascular:      Rate and Rhythm: Normal rate and regular rhythm.      Pulses: Normal pulses.      Heart sounds: Normal heart sounds.   Pulmonary:      Effort: Pulmonary effort is normal.      Breath sounds: Normal breath sounds.   Abdominal:      General: Bowel sounds are normal.      Palpations: Abdomen is soft.   Musculoskeletal:         General: Normal range of motion.  "     Cervical back: Normal range of motion.   Skin:     General: Skin is warm and dry.      Capillary Refill: Capillary refill takes less than 2 seconds.   Neurological:      General: No focal deficit present.      Mental Status: She is alert. Mental status is at baseline.      Sensory: No sensory deficit.      Motor: No weakness.      Coordination: Coordination normal.   Psychiatric:         Mood and Affect: Mood normal.         Behavior: Behavior normal.         Fluids    Intake/Output Summary (Last 24 hours) at 4/8/2024 1026  Last data filed at 4/7/2024 2001  Gross per 24 hour   Intake 200 ml   Output --   Net 200 ml       Laboratory  Recent Labs     04/05/24  1528 04/06/24  0510 04/07/24  1042   WBC 9.5 11.1* 8.1   RBC 4.90 4.73 4.29   HEMOGLOBIN 15.5 15.1 13.7   HEMATOCRIT 46.9 44.1 41.1   MCV 95.7 93.2 95.8   MCH 31.6 31.9 31.9   MCHC 33.0 34.2 33.3   RDW 44.4 43.3 45.2   PLATELETCT 352 331 292   MPV 10.9 9.7 9.8     Recent Labs     04/06/24  0510 04/07/24  1042 04/08/24  0658   SODIUM 139 141 139   POTASSIUM 4.3 3.3* 4.2   CHLORIDE 108 109 108   CO2 15* 21 20   GLUCOSE 149* 129* 89   BUN 8 10 7*   CREATININE 0.60 0.76 0.56   CALCIUM 9.4 8.7 8.8                   Imaging  MR-THORACIC SPINE-WITH & W/O   Final Result      1.  CSF signal extradural fluid collection consistent with hygroma, primarily dorsal throughout the entire thoracic spine. This is most prominent in the upper thoracic spine measuring up to 7 mm in thickness at the T4-T5 level. At the upper thoracic    levels this results in mild central stenosis with effacement of subarachnoid space in the thecal sac.   2.  No appreciable source of this \"CSF leak\". No arachnoid cyst, dural ectasia, perineural cyst/Tarlov cyst is evident at any thoracic level.   3.  No significant degenerative changes. No significant disc bulge or protrusion or foraminal stenosis at any thoracic level.      MR-LUMBAR SPINE-WITH & W/O   Final Result      1.  CSF signal subdural " "or epidural hygroma primarily dorsal around the thecal sac. On this scan, this can be seen from the T12 level extending down to about L3-4. The collection measures up to about 3 mm in thickness in the dorsal midline at the T12    level.   2.  No appreciable arachnoid cyst, dural ectasia, or perineural/Tarlov cyst as a source for this presumed \"CSF leak\"   3.  No significant degenerative changes. No significant disc bulge or protrusion, central stenosis, or foraminal stenosis at any lumbar level.   4.  Incidentally noted 59 mm cyst in the left paramedian pelvis most consistent with an ovarian cyst. This could be further evaluated with ultrasound if clinically indicated.      MR-CERVICAL SPINE-WITH & W/O   Final Result      1.  Rim of extradural CSF signal fluid surrounding the thecal sac extending from about C3-C4 down to the T2 level at the bottom of the field-of-view. Thin rim of corresponding primarily dorsal dural enhancement. Findings are most consistent with subdural    or epidural hygroma. As clinically suspected, this may be related to a spontaneous or posttraumatic CSF leak (assuming the patient has not had a recent lumbar puncture).   2.  No appreciable arachnoid cyst or perineural cyst at the cervical levels as a potential source for CSF leakage.   3.  No significant degenerative changes.   4.  No myelopathic cord signal.      MR-BRAIN-WITH & W/O   Final Result      1.  MRI OF THE BRAIN WITHOUT AND WITH CONTRAST WITHIN NORMAL LIMITS.   2.  REGARDING THE CLINICAL CONCERN FOR \"CSF LEAK\", NO EXTRA-AXIAL FLUID COLLECTIONS, BRAIN SAGGING, OR OTHER STIGMATA OF INTRACRANIAL HYPOTENSION.      CT-HEAD W/O   Final Result         1. No acute intracranial abnormality. No evidence of acute intracranial hemorrhage or mass lesion.                     IR-SPINAL BLOOD PATCH INJECTION    (Results Pending)        Assessment/Plan  * Nonintractable headache, unspecified chronicity pattern, unspecified headache type- (present " on admission)  Assessment & Plan  CT head unremarkable  MRI brain, cervical, thoracic, lumbar spine with and without completed -no finding that required surgical intervention    Patch ordered  Tylenol and ibuprofen ordered as needed for headaches    Hygroma- (present on admission)  Assessment & Plan  Neuro IR on the case  MRI brain, cervical, thoracic, lumbar spine with and without completed showing hygroma from C3-4 to L3-4 and upper thoracic levels showing mild central stenosis with effacement of subarachnoid space in the thecal sac     Nausea & vomiting- (present on admission)  Assessment & Plan  Positional in nature.  Continue with IV and p.o. antiemetics       VTE prophylaxis:   SCDs/TEDs    I have performed a physical exam and reviewed and updated ROS and Plan today (4/8/2024). In review of yesterday's note (4/7/2024), there are no changes except as documented above.

## 2024-04-08 NOTE — CARE PLAN
The patient is Stable - Low risk of patient condition declining or worsening    Shift Goals  Clinical Goals: npo at midnight  Patient Goals: sleep comforably  Family Goals: No family present    Progress made toward(s) clinical / shift goals:    Problem: Pain - Standard  Goal: Alleviation of pain or a reduction in pain to the patient’s comfort goal  Outcome: Progressing

## 2024-04-08 NOTE — PROGRESS NOTES
"Referring Physician: JERICHO Carrion    S:  Status-post blood patch by Neuro IR.  Currently laying flat post procedure.  Little bit groggy.  No headache; however has not tried to sit up yet.  No new neurologic symptoms.    O:    Vitals:    04/08/24 1059   BP: 115/76   Pulse: 65   Resp: 16   Temp: 36.5 °C (97.7 °F)   SpO2: 96%     Awake, alert and interactive. Attention is intact. Conversant.  Language is fluent with intact comprehension. No dysarthria.  Good eye contact. Visually tracks the examiner.  Face appears symmetric.  Hearing is intact to conversation.  Moves all extremities symmetrically.  Antigravity without drift    MR-THORACIC SPINE-WITH & W/O   Final Result      1.  CSF signal extradural fluid collection consistent with hygroma, primarily dorsal throughout the entire thoracic spine. This is most prominent in the upper thoracic spine measuring up to 7 mm in thickness at the T4-T5 level. At the upper thoracic    levels this results in mild central stenosis with effacement of subarachnoid space in the thecal sac.   2.  No appreciable source of this \"CSF leak\". No arachnoid cyst, dural ectasia, perineural cyst/Tarlov cyst is evident at any thoracic level.   3.  No significant degenerative changes. No significant disc bulge or protrusion or foraminal stenosis at any thoracic level.      MR-LUMBAR SPINE-WITH & W/O   Final Result      1.  CSF signal subdural or epidural hygroma primarily dorsal around the thecal sac. On this scan, this can be seen from the T12 level extending down to about L3-4. The collection measures up to about 3 mm in thickness in the dorsal midline at the T12    level.   2.  No appreciable arachnoid cyst, dural ectasia, or perineural/Tarlov cyst as a source for this presumed \"CSF leak\"   3.  No significant degenerative changes. No significant disc bulge or protrusion, central stenosis, or foraminal stenosis at any lumbar level.   4.  Incidentally noted 59 mm cyst in the left " "paramedian pelvis most consistent with an ovarian cyst. This could be further evaluated with ultrasound if clinically indicated.      MR-CERVICAL SPINE-WITH & W/O   Final Result      1.  Rim of extradural CSF signal fluid surrounding the thecal sac extending from about C3-C4 down to the T2 level at the bottom of the field-of-view. Thin rim of corresponding primarily dorsal dural enhancement. Findings are most consistent with subdural    or epidural hygroma. As clinically suspected, this may be related to a spontaneous or posttraumatic CSF leak (assuming the patient has not had a recent lumbar puncture).   2.  No appreciable arachnoid cyst or perineural cyst at the cervical levels as a potential source for CSF leakage.   3.  No significant degenerative changes.   4.  No myelopathic cord signal.      MR-BRAIN-WITH & W/O   Final Result      1.  MRI OF THE BRAIN WITHOUT AND WITH CONTRAST WITHIN NORMAL LIMITS.   2.  REGARDING THE CLINICAL CONCERN FOR \"CSF LEAK\", NO EXTRA-AXIAL FLUID COLLECTIONS, BRAIN SAGGING, OR OTHER STIGMATA OF INTRACRANIAL HYPOTENSION.      CT-HEAD W/O   Final Result         1. No acute intracranial abnormality. No evidence of acute intracranial hemorrhage or mass lesion.                     IR-SPINAL BLOOD PATCH INJECTION    (Results Pending)       Impression & Recommendations: Status post blood patch for spontaneous CSF leak.  Currently without headache post-procedure laying flat.  Has not tried to sit up or stand yet.  Tolerated the procedure well.  Continues to have good extremity strength without long tract signs.  Neurology will follow peripherally.    I provided a total of 25 minutes of acute neurologic care for this patient encounter reviewing medical records, diagnostic studies, direct face-to-face time with the patient and/or family, documentation, communicating and coordinating plan of care.      Steven Nagy MD  Neurohospitalist, Acute Care Services          Please note that this " dictation was created using voice recognition software.  I have made every reasonable attempt to correct obvious errors, but I expect that there are errors of grammar and possibly content that I did not discover before finalizing the note.

## 2024-04-08 NOTE — PROGRESS NOTES
Pt presents to IR2. Patient was consented by MD at bedside, confirmed by this RN and consent at bedside. Pt transferred to IR table in prone position. Patient underwent a spinal blood patch by Dr. Duenas. Procedure site was marked by MD and verified using imaging guidance. Pt placed on monitor, prepped and draped in a sterile fashion. Vitals were taken every 5 minutes and remained stable during procedure (see doc flow sheet for results). CO2 waveform capnography was monitored and remained WNL throughout procedure. Report called to  RN. Pt transported by bed with RN to [id number].

## 2024-04-08 NOTE — PROGRESS NOTES
Neurosurgery Note    Reviewed MRIs. No obvious source of CSF leak.  Upper thoracic extraaxial CSF collection 7mm max width with moderate spinal compression.  Patient is asymptomatic in lower extremities, no long tract signs.  Plan for epidural blood patch with IR today.    Will follow peripherally at this time.    Anneliese Nixon M.D.

## 2024-04-08 NOTE — PROGRESS NOTES
Pt is feeling good and is ready for discharge. No further needs or questions at this time. IV removed. Belongings at bedside. Pending DC lounge transport.

## 2024-04-08 NOTE — DISCHARGE SUMMARY
Discharge Summary    CHIEF COMPLAINT ON ADMISSION  Chief Complaint   Patient presents with    Headache     Upper back pain that started on Tuesday when pt woke up. Pt started to radiate up her neck and into her head on Wednesday. Pt rates headache at 9/10 and is worst in the front of her head. +nausea due to pain. Pt states that it hurts to turn her head. Pt has some relief when laying flat.        Reason for Admission  Back Pain; Neck Pain; Headache     Admission Date  4/5/2024    CODE STATUS  Full Code    HPI & HOSPITAL COURSE    Rachel Villalobos is a 29 y.o. female with no past medical history who presented 4/5/2024 with headaches.     Patient denies any history of headaches in the past or history of migraines.  States that over the last 4 days she is experiencing positional headaches. She states that when she lays down flat, she feels fine. However when she sits up, after 30 seconds she begins to experience headaches located in the frontal and occipital regions.  It is associated with some nausea and vomiting as well.      In the emergency department, vital signs stable.  CT head was unremarkable.  Case was briefly discussed between the ER physician and neurology on-call.  Due to concern for CSF leak, recommending MRI brain with and without contrast at this time.     MRI brain and cervical spine completed which is showing findings consistent with subdural or epidural hygroma.  This may be related to spontaneous or posttraumatic CSF leak.  I have discussed the case with neuro IR and neurosurgery.  At this time neuro IR is recommending MRI of the thoracic and lumbar spine. Potential intervention by neuro IR on Monday.    Patient seen and examined this a.m.  She states that there is not much change from yesterday where getting up still gives her headache but today it is taking longer for the headache onset.  She is denying any numbness, tingling, dizziness, lightheadedness, or any other sensory deficits.  The  only positive review of system is headache.    This patient seen by neurology and neurosurgery and there is a concern for possible CSF leak.  The patient was referred to IR and a blood patch was placed on 4/8/2024.  Within 1 hour patient had marked improvement in her complaints of headache.  Patient was cleared for discharge home with the below medications.  Instructed to notify medical professional if the symptoms worsen    Therefore, she is discharged in good and stable condition to home with close outpatient follow-up.    The patient met 2-midnight criteria for an inpatient stay at the time of discharge.    Discharge Date  4/8/2024    FOLLOW UP ITEMS POST DISCHARGE      DISCHARGE DIAGNOSES  Principal Problem:    Nonintractable headache, unspecified chronicity pattern, unspecified headache type (POA: Yes)  Active Problems:    Nausea & vomiting (POA: Yes)    Hygroma (POA: Yes)  Resolved Problems:    * No resolved hospital problems. *      FOLLOW UP  No future appointments.  [facility]  [address]  [place] [address] [phone]  Call   to establish primary care physician    [facility], Emergency Dept  [address] [place] [address] [phone]    If symptoms worsen      MEDICATIONS ON DISCHARGE     Medication List        START taking these medications        Instructions   cyclobenzaprine 10 mg Tabs  Commonly known as: Flexeril   Take 1 Tablet by mouth every 6 hours as needed for Muscle Spasms.  Dose: 10 mg     ondansetron 4 MG Tbdp  Commonly known as: Zofran ODT   Take 1 Tablet by mouth every four hours as needed for Nausea/Vomiting  Dose: 4 mg            CONTINUE taking these medications        Instructions   asa/apap/caffeine 250-250-65 MG Tabs  Commonly known as: Excedrin   Take 2 Tablets by mouth every 6 hours as needed for Headache.  Dose: 2 Tablet            STOP taking these medications      ibuprofen 200 MG Tabs  Commonly known as: Motrin               Allergies  No Known Allergies    DIET  Orders Placed This Encounter   Procedures    Diet NPO Restrict to: Strict     Standing Status:   Standing     Number of Occurrences:   1     Order Specific Question:   Diet NPO Restrict to:     Answer:   Strict [1]       ACTIVITY  As tolerated.  Weight bearing as tolerated    CONSULTATIONS  Yulissa ayon neuro    PROCEDURES      [address] [place] [id number]       Rachel Villalobos   MRN: [id number], : 1995 , Sex: F   Admit: 2024, D/C: 2024          Reginald Duenas M.D.  Physician  Radiology     OR Surgeon     Signed     Date of Service: 2024  9:58 AM                   Show:Clear all  [x]Written[x]Templated[]Copied    Added by:  [x]Reginald Duenas M.D.      []Hover for details    Immediate Post- Operative Note           Findings: L2-3 Epidural blood patch performed with 30 cc autologous blood.        Procedure(s): L2-3 epidural blood patch        Estimated Blood Loss: Less than 5 ml           Complications: None                 2024     9:58 AM     Reginald Duenas M.D.                       LABORATORY  Lab Results   Component Value Date    SODIUM 139 2024    POTASSIUM 4.2 2024    CHLORIDE 108 2024    CO2 20 2024    GLUCOSE 89 2024    BUN 7 (L) 2024    CREATININE 0.56 2024        Lab Results   Component Value Date    WBC 8.1 2024    HEMOGLOBIN 13.7 2024    HEMATOCRIT 41.1 2024    PLATELETCT 292 2024        Total time of the discharge process exceeds 40 minutes.

## 2024-04-08 NOTE — OR SURGEON
Immediate Post- Operative Note        Findings: L2-3 Epidural blood patch performed with 30 cc autologous blood.      Procedure(s): L2-3 epidural blood patch      Estimated Blood Loss: Less than 5 ml        Complications: None            4/8/2024     9:58 AM     Reginald Duenas M.D.

## 2024-04-08 NOTE — H&P
History and Physical    Date: 4/8/2024    PCP: Pcp Pt States None      CC: Headache, upper back pain.    HPI: This is a 29 y.o. female who is presenting for spontaneous onset of a positional headache and neck pain. No inciting events in the history. Headache worse on sitting up and relieved with lying down, has a classic CSF hypotension picture.  MRI confirms longitudinal collections of CSF in the cervicothoracic region concerning for a dural tear.    Past Medical History:   Diagnosis Date    Gastric cyst 01/01/2020       History reviewed. No pertinent surgical history.    Current Facility-Administered Medications   Medication Dose Route Frequency Provider Last Rate Last Admin    NS infusion   Intravenous Continuous Arnulfo Le M.D.        NS infusion   Intravenous Continuous Summer Regalado, A.P.R.N. 75 mL/hr at 04/07/24 1616 New Bag at 04/07/24 1616    hydrOXYzine HCl (Atarax) tablet 25 mg  25 mg Oral HS PRN Katie Aponte A.P.R.N.   25 mg at 04/07/24 2203    SUMAtriptan (Imitrex) tablet 25 mg  25 mg Oral Q2HRS PRN Shefali Davalos, A.P.R.N.        butalbital/apap/caffeine (Fioricet) -40 mg per tablet 1 Tablet  1 Tablet Oral Q6HRS PRN Shefali Davalos, A.P.R.N.   1 Tablet at 04/06/24 0511    acetaminophen (Tylenol) tablet 650 mg  650 mg Oral Q6HRS PRN Gary Sorensen M.D.        ondansetron (Zofran) syringe/vial injection 4 mg  4 mg Intravenous Q4HRS PRN Gary Sorensen M.D.        ondansetron (Zofran ODT) dispertab 4 mg  4 mg Oral Q4HRS PRDELIA Sorensen M.D.        promethazine (Phenergan) tablet 12.5-25 mg  12.5-25 mg Oral Q4HRS PRDELIA Sorensen M.D.        promethazine (Phenergan) suppository 12.5-25 mg  12.5-25 mg Rectal Q4HRS PRDELIA Sorensen M.D.        prochlorperazine (Compazine) injection 5-10 mg  5-10 mg Intravenous Q4HRS PRN Gary Sorensen M.D.        ibuprofen (Motrin) tablet 600 mg  600 mg Oral Q6HRS PATTIE Sorensen M.D.   600 mg at 04/05/24 2041        Social  History     Socioeconomic History    Marital status: Single     Spouse name: Not on file    Number of children: Not on file    Years of education: Not on file    Highest education level: Not on file   Occupational History    Not on file   Tobacco Use    Smoking status: Former     Current packs/day: 0.00     Average packs/day: 1 pack/day for 2.0 years (2.0 ttl pk-yrs)     Types: Cigarettes     Start date: 2016     Quit date: 2018     Years since quittin.2    Smokeless tobacco: Current    Tobacco comments:     Synthetic nicotine 3mg packs 8-10 per day   Vaping Use    Vaping Use: Never used   Substance and Sexual Activity    Alcohol use: Yes     Alcohol/week: 3.0 oz     Types: 5 Shots of liquor per week    Drug use: Not Currently     Frequency: 3.0 times per week     Types: Marijuana, Inhaled    Sexual activity: Yes     Partners: Male   Other Topics Concern    Not on file   Social History Narrative    Not on file     Social Determinants of Health     Financial Resource Strain: Not on file   Food Insecurity: Not on file   Transportation Needs: Not on file   Physical Activity: Not on file   Stress: Not on file   Social Connections: Not on file   Intimate Partner Violence: Not on file   Housing Stability: Not on file       History reviewed. No pertinent family history.    Allergies:  Patient has no known allergies.    Review of Systems:  Negative      Physical Exam  Nursing note reviewed.   Constitutional:       General: She is not in acute distress.     Appearance: Normal appearance.   Pulmonary:      Effort: Pulmonary effort is normal. No respiratory distress.   Skin:     General: Skin is warm and dry.   Neurological:      General: No focal deficit present.      Mental Status: She is alert and oriented to person, place, and time.   Psychiatric:         Mood and Affect: Mood normal.         Behavior: Behavior normal.         Thought Content: Thought content normal.         Judgment: Judgment normal.  "        Vital Signs  Blood Pressure: 117/81   Temperature: 36.4 °C (97.6 °F)   Pulse: 65   Respiration: 16   Pulse Oximetry: 96 %        Labs:  Recent Labs     04/05/24  1528 04/06/24  0510 04/07/24  1042   WBC 9.5 11.1* 8.1   RBC 4.90 4.73 4.29   HEMOGLOBIN 15.5 15.1 13.7   HEMATOCRIT 46.9 44.1 41.1   MCV 95.7 93.2 95.8   MCH 31.6 31.9 31.9   MCHC 33.0 34.2 33.3   RDW 44.4 43.3 45.2   PLATELETCT 352 331 292   MPV 10.9 9.7 9.8     Recent Labs     04/06/24  0510 04/07/24  1042 04/08/24  0658   SODIUM 139 141 139   POTASSIUM 4.3 3.3* 4.2   CHLORIDE 108 109 108   CO2 15* 21 20   GLUCOSE 149* 129* 89   BUN 8 10 7*   CREATININE 0.60 0.76 0.56   CALCIUM 9.4 8.7 8.8         Recent Labs     04/06/24  0510   ASTSGOT 11*   ALTSGPT 20   TBILIRUBIN 0.3   ALKPHOSPHAT 79   GLOBULIN 3.2       Radiology:  MR-THORACIC SPINE-WITH & W/O   Final Result      1.  CSF signal extradural fluid collection consistent with hygroma, primarily dorsal throughout the entire thoracic spine. This is most prominent in the upper thoracic spine measuring up to 7 mm in thickness at the T4-T5 level. At the upper thoracic    levels this results in mild central stenosis with effacement of subarachnoid space in the thecal sac.   2.  No appreciable source of this \"CSF leak\". No arachnoid cyst, dural ectasia, perineural cyst/Tarlov cyst is evident at any thoracic level.   3.  No significant degenerative changes. No significant disc bulge or protrusion or foraminal stenosis at any thoracic level.      MR-LUMBAR SPINE-WITH & W/O   Final Result      1.  CSF signal subdural or epidural hygroma primarily dorsal around the thecal sac. On this scan, this can be seen from the T12 level extending down to about L3-4. The collection measures up to about 3 mm in thickness in the dorsal midline at the T12    level.   2.  No appreciable arachnoid cyst, dural ectasia, or perineural/Tarlov cyst as a source for this presumed \"CSF leak\"   3.  No significant degenerative " "changes. No significant disc bulge or protrusion, central stenosis, or foraminal stenosis at any lumbar level.   4.  Incidentally noted 59 mm cyst in the left paramedian pelvis most consistent with an ovarian cyst. This could be further evaluated with ultrasound if clinically indicated.      MR-CERVICAL SPINE-WITH & W/O   Final Result      1.  Rim of extradural CSF signal fluid surrounding the thecal sac extending from about C3-C4 down to the T2 level at the bottom of the field-of-view. Thin rim of corresponding primarily dorsal dural enhancement. Findings are most consistent with subdural    or epidural hygroma. As clinically suspected, this may be related to a spontaneous or posttraumatic CSF leak (assuming the patient has not had a recent lumbar puncture).   2.  No appreciable arachnoid cyst or perineural cyst at the cervical levels as a potential source for CSF leakage.   3.  No significant degenerative changes.   4.  No myelopathic cord signal.      MR-BRAIN-WITH & W/O   Final Result      1.  MRI OF THE BRAIN WITHOUT AND WITH CONTRAST WITHIN NORMAL LIMITS.   2.  REGARDING THE CLINICAL CONCERN FOR \"CSF LEAK\", NO EXTRA-AXIAL FLUID COLLECTIONS, BRAIN SAGGING, OR OTHER STIGMATA OF INTRACRANIAL HYPOTENSION.      CT-HEAD W/O   Final Result         1. No acute intracranial abnormality. No evidence of acute intracranial hemorrhage or mass lesion.                     IR-SPINAL BLOOD PATCH INJECTION    (Results Pending)             Assessment and Plan: This is a 29 y.o. with positional headache suggesting a CSF leak with spinal imaging confirming the same presenting today for an epidural blood patch. The indications, benefits, alternatives and risks to the procedure were discussed with the patient and  family. Risks discussed included but were not limited to bleeding, infection, stroke, injury to nearby structures, new temporary or permanent motor weakness, non target embolization due to migration of embolic " material/cement,  arterial or venous injury and rarely death. On label and Off label use of catheters, needles, implantable stents/devices and embolic agents was also discussed. Possibility of failure of procedure or need for a repeat procedure or staged additional procedures were also discussed. Patient provided consent and is agreeable to go ahead with the planned procedure.  Procedure will be performed under moderate sedation.